# Patient Record
Sex: FEMALE | Race: WHITE | NOT HISPANIC OR LATINO | URBAN - METROPOLITAN AREA
[De-identification: names, ages, dates, MRNs, and addresses within clinical notes are randomized per-mention and may not be internally consistent; named-entity substitution may affect disease eponyms.]

---

## 2017-06-26 ENCOUNTER — EMERGENCY (EMERGENCY)
Facility: HOSPITAL | Age: 72
LOS: 1 days | Discharge: HOME | End: 2017-06-26
Attending: EMERGENCY MEDICINE | Admitting: EMERGENCY MEDICINE
Payer: COMMERCIAL

## 2017-06-26 VITALS
DIASTOLIC BLOOD PRESSURE: 80 MMHG | OXYGEN SATURATION: 96 % | SYSTOLIC BLOOD PRESSURE: 170 MMHG | HEART RATE: 81 BPM | HEIGHT: 59 IN | RESPIRATION RATE: 18 BRPM | TEMPERATURE: 98 F | WEIGHT: 134.92 LBS

## 2017-06-26 LAB
ALBUMIN SERPL ELPH-MCNC: 4.5 G/DL — SIGNIFICANT CHANGE UP (ref 3.3–5.2)
ALP SERPL-CCNC: 45 U/L — SIGNIFICANT CHANGE UP (ref 40–120)
ALT FLD-CCNC: 19 U/L — SIGNIFICANT CHANGE UP
ANION GAP SERPL CALC-SCNC: 15 MMOL/L — SIGNIFICANT CHANGE UP (ref 5–17)
AST SERPL-CCNC: 26 U/L — SIGNIFICANT CHANGE UP
BASOPHILS # BLD AUTO: 0 K/UL — SIGNIFICANT CHANGE UP (ref 0–0.2)
BASOPHILS NFR BLD AUTO: 0.3 % — SIGNIFICANT CHANGE UP (ref 0–2)
BILIRUB SERPL-MCNC: 0.3 MG/DL — LOW (ref 0.4–2)
BUN SERPL-MCNC: 28 MG/DL — HIGH (ref 8–20)
CALCIUM SERPL-MCNC: 10 MG/DL — SIGNIFICANT CHANGE UP (ref 8.6–10.2)
CHLORIDE SERPL-SCNC: 101 MMOL/L — SIGNIFICANT CHANGE UP (ref 98–107)
CK SERPL-CCNC: 115 U/L — SIGNIFICANT CHANGE UP (ref 25–170)
CO2 SERPL-SCNC: 24 MMOL/L — SIGNIFICANT CHANGE UP (ref 22–29)
CREAT SERPL-MCNC: 0.75 MG/DL — SIGNIFICANT CHANGE UP (ref 0.5–1.3)
EOSINOPHIL # BLD AUTO: 0.1 K/UL — SIGNIFICANT CHANGE UP (ref 0–0.5)
EOSINOPHIL NFR BLD AUTO: 1.3 % — SIGNIFICANT CHANGE UP (ref 0–6)
GLUCOSE SERPL-MCNC: 96 MG/DL — SIGNIFICANT CHANGE UP (ref 70–115)
HCT VFR BLD CALC: 38.3 % — SIGNIFICANT CHANGE UP (ref 37–47)
HGB BLD-MCNC: 12.6 G/DL — SIGNIFICANT CHANGE UP (ref 12–16)
LYMPHOCYTES # BLD AUTO: 1.9 K/UL — SIGNIFICANT CHANGE UP (ref 1–4.8)
LYMPHOCYTES # BLD AUTO: 27.4 % — SIGNIFICANT CHANGE UP (ref 20–55)
MCHC RBC-ENTMCNC: 27.9 PG — SIGNIFICANT CHANGE UP (ref 27–31)
MCHC RBC-ENTMCNC: 32.9 G/DL — SIGNIFICANT CHANGE UP (ref 32–36)
MCV RBC AUTO: 84.7 FL — SIGNIFICANT CHANGE UP (ref 81–99)
MONOCYTES # BLD AUTO: 0.6 K/UL — SIGNIFICANT CHANGE UP (ref 0–0.8)
MONOCYTES NFR BLD AUTO: 8.3 % — SIGNIFICANT CHANGE UP (ref 3–10)
NEUTROPHILS # BLD AUTO: 4.4 K/UL — SIGNIFICANT CHANGE UP (ref 1.8–8)
NEUTROPHILS NFR BLD AUTO: 62.4 % — SIGNIFICANT CHANGE UP (ref 37–73)
PLATELET # BLD AUTO: 214 K/UL — SIGNIFICANT CHANGE UP (ref 150–400)
POTASSIUM SERPL-MCNC: 4 MMOL/L — SIGNIFICANT CHANGE UP (ref 3.5–5.3)
POTASSIUM SERPL-SCNC: 4 MMOL/L — SIGNIFICANT CHANGE UP (ref 3.5–5.3)
PROT SERPL-MCNC: 7.3 G/DL — SIGNIFICANT CHANGE UP (ref 6.6–8.7)
RBC # BLD: 4.52 M/UL — SIGNIFICANT CHANGE UP (ref 4.4–5.2)
RBC # FLD: 14.9 % — SIGNIFICANT CHANGE UP (ref 11–15.6)
SODIUM SERPL-SCNC: 140 MMOL/L — SIGNIFICANT CHANGE UP (ref 135–145)
TROPONIN T SERPL-MCNC: <0.01 NG/ML — SIGNIFICANT CHANGE UP (ref 0–0.06)
WBC # BLD: 7.1 K/UL — SIGNIFICANT CHANGE UP (ref 4.8–10.8)
WBC # FLD AUTO: 7.1 K/UL — SIGNIFICANT CHANGE UP (ref 4.8–10.8)

## 2017-06-26 PROCEDURE — 99285 EMERGENCY DEPT VISIT HI MDM: CPT

## 2017-06-26 RX ORDER — MORPHINE SULFATE 50 MG/1
2 CAPSULE, EXTENDED RELEASE ORAL ONCE
Qty: 0 | Refills: 0 | Status: DISCONTINUED | OUTPATIENT
Start: 2017-06-26 | End: 2017-06-26

## 2017-06-26 RX ADMIN — MORPHINE SULFATE 2 MILLIGRAM(S): 50 CAPSULE, EXTENDED RELEASE ORAL at 23:29
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
## 2017-06-27 VITALS
SYSTOLIC BLOOD PRESSURE: 131 MMHG | OXYGEN SATURATION: 97 % | RESPIRATION RATE: 16 BRPM | TEMPERATURE: 97 F | DIASTOLIC BLOOD PRESSURE: 61 MMHG | HEART RATE: 85 BPM

## 2017-06-27 PROCEDURE — 80053 COMPREHEN METABOLIC PANEL: CPT

## 2017-06-27 PROCEDURE — 93005 ELECTROCARDIOGRAM TRACING: CPT

## 2017-06-27 PROCEDURE — 84484 ASSAY OF TROPONIN QUANT: CPT

## 2017-06-27 PROCEDURE — 36415 COLL VENOUS BLD VENIPUNCTURE: CPT

## 2017-06-27 PROCEDURE — 99284 EMERGENCY DEPT VISIT MOD MDM: CPT | Mod: 25

## 2017-06-27 PROCEDURE — 71260 CT THORAX DX C+: CPT

## 2017-06-27 PROCEDURE — 82550 ASSAY OF CK (CPK): CPT

## 2017-06-27 PROCEDURE — 85027 COMPLETE CBC AUTOMATED: CPT

## 2017-06-27 PROCEDURE — 96374 THER/PROPH/DIAG INJ IV PUSH: CPT | Mod: XU

## 2020-01-28 PROBLEM — E78.5 HYPERLIPIDEMIA, UNSPECIFIED: Chronic | Status: ACTIVE | Noted: 2017-06-27

## 2020-01-28 PROBLEM — Z95.0 PRESENCE OF CARDIAC PACEMAKER: Chronic | Status: ACTIVE | Noted: 2017-06-27

## 2020-01-28 PROBLEM — I10 ESSENTIAL (PRIMARY) HYPERTENSION: Chronic | Status: ACTIVE | Noted: 2017-06-27

## 2020-01-28 PROBLEM — A69.20 LYME DISEASE, UNSPECIFIED: Chronic | Status: ACTIVE | Noted: 2017-06-27

## 2020-01-28 PROBLEM — I45.9 CONDUCTION DISORDER, UNSPECIFIED: Chronic | Status: ACTIVE | Noted: 2017-06-27

## 2020-07-17 ENCOUNTER — APPOINTMENT (OUTPATIENT)
Dept: INTERNAL MEDICINE | Facility: CLINIC | Age: 75
End: 2020-07-17
Payer: MEDICARE

## 2020-07-17 VITALS
SYSTOLIC BLOOD PRESSURE: 112 MMHG | HEART RATE: 76 BPM | WEIGHT: 138 LBS | DIASTOLIC BLOOD PRESSURE: 72 MMHG | HEIGHT: 58.5 IN | OXYGEN SATURATION: 98 % | TEMPERATURE: 97.3 F | BODY MASS INDEX: 28.2 KG/M2 | RESPIRATION RATE: 14 BRPM

## 2020-07-17 DIAGNOSIS — Z82.49 FAMILY HISTORY OF ISCHEMIC HEART DISEASE AND OTHER DISEASES OF THE CIRCULATORY SYSTEM: ICD-10-CM

## 2020-07-17 DIAGNOSIS — Z78.9 OTHER SPECIFIED HEALTH STATUS: ICD-10-CM

## 2020-07-17 DIAGNOSIS — Z80.8 FAMILY HISTORY OF MALIGNANT NEOPLASM OF OTHER ORGANS OR SYSTEMS: ICD-10-CM

## 2020-07-17 DIAGNOSIS — J30.2 OTHER SEASONAL ALLERGIC RHINITIS: ICD-10-CM

## 2020-07-17 DIAGNOSIS — Z63.5 DISRUPTION OF FAMILY BY SEPARATION AND DIVORCE: ICD-10-CM

## 2020-07-17 PROCEDURE — G0442 ANNUAL ALCOHOL SCREEN 15 MIN: CPT | Mod: CS,59

## 2020-07-17 PROCEDURE — G0438: CPT | Mod: CS

## 2020-07-17 PROCEDURE — 36415 COLL VENOUS BLD VENIPUNCTURE: CPT | Mod: CS

## 2020-07-17 RX ORDER — MONTELUKAST 10 MG/1
10 TABLET, FILM COATED ORAL
Refills: 0 | Status: ACTIVE | COMMUNITY
Start: 2020-07-17

## 2020-07-17 RX ORDER — LEVOCETIRIZINE DIHYDROCHLORIDE 5 MG/1
5 TABLET ORAL
Refills: 0 | Status: ACTIVE | COMMUNITY
Start: 2020-07-17

## 2020-07-17 RX ORDER — ASPIRIN 325 MG/1
325 TABLET, FILM COATED ORAL
Refills: 0 | Status: ACTIVE | COMMUNITY
Start: 2020-07-17

## 2020-07-17 RX ORDER — COLD-HOT PACK
125 MCG EACH MISCELLANEOUS
Refills: 0 | Status: ACTIVE | COMMUNITY
Start: 2020-07-17

## 2020-07-17 SDOH — SOCIAL STABILITY - SOCIAL INSECURITY: DISRUPTION OF FAMILY BY SEPARATION AND DIVORCE: Z63.5

## 2020-07-17 NOTE — PHYSICAL EXAM
[No Acute Distress] : no acute distress [Well Nourished] : well nourished [Well-Appearing] : well-appearing [Well Developed] : well developed [Normal Sclera/Conjunctiva] : normal sclera/conjunctiva [EOMI] : extraocular movements intact [PERRL] : pupils equal round and reactive to light [Normal Oropharynx] : the oropharynx was normal [Normal Outer Ear/Nose] : the outer ears and nose were normal in appearance [No JVD] : no jugular venous distention [No Lymphadenopathy] : no lymphadenopathy [Supple] : supple [Thyroid Normal, No Nodules] : the thyroid was normal and there were no nodules present [No Respiratory Distress] : no respiratory distress  [No Accessory Muscle Use] : no accessory muscle use [Normal Rate] : normal rate  [Clear to Auscultation] : lungs were clear to auscultation bilaterally [Normal S1, S2] : normal S1 and S2 [Regular Rhythm] : with a regular rhythm [No Carotid Bruits] : no carotid bruits [No Abdominal Bruit] : a ~M bruit was not heard ~T in the abdomen [No Murmur] : no murmur heard [No Edema] : there was no peripheral edema [Pedal Pulses Present] : the pedal pulses are present [No Varicosities] : no varicosities [No Palpable Aorta] : no palpable aorta [No Extremity Clubbing/Cyanosis] : no extremity clubbing/cyanosis [Non Tender] : non-tender [Soft] : abdomen soft [Non-distended] : non-distended [No HSM] : no HSM [No Masses] : no abdominal mass palpated [Normal Bowel Sounds] : normal bowel sounds [Normal Posterior Cervical Nodes] : no posterior cervical lymphadenopathy [Normal Anterior Cervical Nodes] : no anterior cervical lymphadenopathy [No Spinal Tenderness] : no spinal tenderness [No CVA Tenderness] : no CVA  tenderness [No Rash] : no rash [Grossly Normal Strength/Tone] : grossly normal strength/tone [No Joint Swelling] : no joint swelling [Coordination Grossly Intact] : coordination grossly intact [No Focal Deficits] : no focal deficits [Normal Affect] : the affect was normal [Normal Gait] : normal gait [Deep Tendon Reflexes (DTR)] : deep tendon reflexes were 2+ and symmetric [Normal Insight/Judgement] : insight and judgment were intact

## 2020-07-20 LAB
25(OH)D3 SERPL-MCNC: 86.8 NG/ML
ALBUMIN SERPL ELPH-MCNC: 4.7 G/DL
ALP BLD-CCNC: 49 U/L
ALT SERPL-CCNC: 14 U/L
ANION GAP SERPL CALC-SCNC: 16 MMOL/L
AST SERPL-CCNC: 20 U/L
BASOPHILS # BLD AUTO: 0.04 K/UL
BASOPHILS NFR BLD AUTO: 0.9 %
BILIRUB SERPL-MCNC: 0.2 MG/DL
BUN SERPL-MCNC: 21 MG/DL
CALCIUM SERPL-MCNC: 9.6 MG/DL
CHLORIDE SERPL-SCNC: 106 MMOL/L
CHOLEST SERPL-MCNC: 189 MG/DL
CHOLEST/HDLC SERPL: 3.2 RATIO
CO2 SERPL-SCNC: 23 MMOL/L
CREAT SERPL-MCNC: 0.69 MG/DL
CREAT SPEC-SCNC: 151 MG/DL
EOSINOPHIL # BLD AUTO: 0.16 K/UL
EOSINOPHIL NFR BLD AUTO: 3.5 %
ESTIMATED AVERAGE GLUCOSE: 111 MG/DL
GLUCOSE SERPL-MCNC: 98 MG/DL
HBA1C MFR BLD HPLC: 5.5 %
HCT VFR BLD CALC: 40.9 %
HDLC SERPL-MCNC: 59 MG/DL
HGB BLD-MCNC: 12.7 G/DL
IMM GRANULOCYTES NFR BLD AUTO: 0.2 %
LDLC SERPL CALC-MCNC: 105 MG/DL
LYMPHOCYTES # BLD AUTO: 1.5 K/UL
LYMPHOCYTES NFR BLD AUTO: 33.3 %
MAN DIFF?: NORMAL
MCHC RBC-ENTMCNC: 28.6 PG
MCHC RBC-ENTMCNC: 31.1 GM/DL
MCV RBC AUTO: 92.1 FL
MICROALBUMIN 24H UR DL<=1MG/L-MCNC: <1.2 MG/DL
MICROALBUMIN/CREAT 24H UR-RTO: NORMAL MG/G
MONOCYTES # BLD AUTO: 0.37 K/UL
MONOCYTES NFR BLD AUTO: 8.2 %
NEUTROPHILS # BLD AUTO: 2.43 K/UL
NEUTROPHILS NFR BLD AUTO: 53.9 %
PLATELET # BLD AUTO: 197 K/UL
POTASSIUM SERPL-SCNC: 4.4 MMOL/L
PROT SERPL-MCNC: 6.9 G/DL
RBC # BLD: 4.44 M/UL
RBC # FLD: 13.4 %
SARS-COV-2 IGG SERPL IA-ACNC: <0.1 INDEX
SARS-COV-2 IGG SERPL QL IA: NEGATIVE
SODIUM SERPL-SCNC: 145 MMOL/L
TRIGL SERPL-MCNC: 121 MG/DL
TSH SERPL-ACNC: 0.3 UIU/ML
WBC # FLD AUTO: 4.51 K/UL

## 2020-07-20 NOTE — HISTORY OF PRESENT ILLNESS
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 3 children. . Non-smoker. No EtOH use. \par \par JORGE LUIS is a 74 year F whom is here today for an annual well check and to establish care with a new PMD\par Today, pt reports feeling well and is w/o complaints. \par \par Follows with the following Physicians: \par -OBGYN: Dr. Vargas ([phone])\par -Cardio: Dr. Duran ([phone])\par -Rheum: Dr. Ortega \par \par -Vaccines: Will need to request immunization records from prior PMD\par -DEXA: 2019\par -Mammogram: 2019\par -Colonoscopy: 10+ yrs ago. Declines repeat but ok w/ FIT-DNA\par -Pap Smear: s/p JILLIAN\par \par -HTN: Remains on Diltiazem, Clonidine & Doxazosin as she was unable to tolerate several other meds. Follows w/ cardio. No reported changes. 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 Came off medication 1/2020. Last DEXA About 1 yr ago. Follows/ Rheum. \par -Hypothyroidism: Remains on Synthroid 112mcg QD. No reported changes. \par -HyperTG: Remains on Fenofibrate. No reported changes.

## 2020-07-20 NOTE — ASSESSMENT
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 3 children. . Non-smoker. No EtOH use. \par \par JORGE LUIS is a 74 year F whom is here today for an annual well check and to establish care with a new PMD\par \par Follows with the following Physicians: \par -OBGYN: Dr. Vargas ([phone])\par -Cardio: Dr. Duran ([phone])\par -Rheum: Dr. Ortega \par \par -Vaccines: Will need to request immunization records from prior PMD\par -DEXA: 2019\par -Mammogram: 2019\par -Colonoscopy: 10+ yrs ago. Declines repeat but ok w/ FIT-DNA\par -Pap Smear: s/p JILLIAN\par \par -F/u Immunization records\par -F/u labs drawn in office today\par -Further recs pending lab results\par -F/u Fit-DNA \par -Continue f/u w/ Cardio (HTN, AV Block)\par -Continue f/u w/ Rheum (Osteoporosis)\par -RTO 6mo for f/u TSH

## 2020-07-20 NOTE — ADDENDUM
[FreeTextEntry1] : CBC/CMP WNL\par A1c/Lipid Panel WNL\par Urine Microalbumin WNL\par COVID AB Negative\par \par #1 Elevated Vit-D: Recommend decreasing daily intake to no more than 2,000iu/day\par #2 Hypothyroidism controlled on current med. Recommend she f/u in 3mo for repeat Vit-D & Thyroid testing,

## 2020-07-20 NOTE — HEALTH RISK ASSESSMENT
[Very Good] : ~his/her~  mood as very good [No falls in past year] : Patient reported no falls in the past year [No] : In the past 12 months have you used drugs other than those required for medical reasons? No [0] : 1) Little interest or pleasure doing things: Not at all (0) [Patient reported mammogram was normal] : Patient reported mammogram was normal [Patient reported bone density results were abnormal] : Patient reported bone density results were abnormal [HIV test declined] : HIV test declined [None] : None [Hepatitis C test declined] : Hepatitis C test declined [Employed] : employed [Alone] : lives alone [Fully functional (bathing, dressing, toileting, transferring, walking, feeding)] : Fully functional (bathing, dressing, toileting, transferring, walking, feeding) [# Of Children ___] : has [unfilled] children [] :  [Fully functional (using the telephone, shopping, preparing meals, housekeeping, doing laundry, using] : Fully functional and needs no help or supervision to perform IADLs (using the telephone, shopping, preparing meals, housekeeping, doing laundry, using transportation, managing medications and managing finances) [Reviewed no changes] : Reviewed no changes [Patient declined colonoscopy] : Patient declined colonoscopy [] : No [[id number]] : 0 [Audit-CScore] : 0 [Change in mental status noted] : No change in mental status noted [Reports changes in hearing] : Reports no changes in hearing [Reports changes in vision] : Reports no changes in vision [MammogramDate] : 01/19 [PapSmearComments] : s/p JILLIAN [BoneDensityDate] : 01/19 [ColonoscopyDate] : 07/20 [ColonoscopyComments] : Ok w/ FIT-DNA [HIVDate] : 07/20 [AdvancecareDate] : 07/20 [HepatitisCDate] : 07/20

## 2021-01-22 ENCOUNTER — APPOINTMENT (OUTPATIENT)
Dept: INTERNAL MEDICINE | Facility: CLINIC | Age: 76
End: 2021-01-22
Payer: MEDICARE

## 2021-01-22 DIAGNOSIS — Z20.822 CONTACT WITH AND (SUSPECTED) EXPOSURE TO COVID-19: ICD-10-CM

## 2021-01-29 ENCOUNTER — LABORATORY RESULT (OUTPATIENT)
Age: 76
End: 2021-01-29

## 2021-01-29 ENCOUNTER — APPOINTMENT (OUTPATIENT)
Dept: INTERNAL MEDICINE | Facility: CLINIC | Age: 76
End: 2021-01-29
Payer: MEDICARE

## 2021-01-29 VITALS
OXYGEN SATURATION: 98 % | RESPIRATION RATE: 14 BRPM | WEIGHT: 150 LBS | BODY MASS INDEX: 30.65 KG/M2 | TEMPERATURE: 97.2 F | HEART RATE: 77 BPM | SYSTOLIC BLOOD PRESSURE: 114 MMHG | DIASTOLIC BLOOD PRESSURE: 80 MMHG | HEIGHT: 58.5 IN

## 2021-01-29 PROCEDURE — 36415 COLL VENOUS BLD VENIPUNCTURE: CPT

## 2021-01-29 PROCEDURE — 99214 OFFICE O/P EST MOD 30 MIN: CPT | Mod: 25

## 2021-02-02 LAB
25(OH)D3 SERPL-MCNC: 64 NG/ML
ANION GAP SERPL CALC-SCNC: 11 MMOL/L
BUN SERPL-MCNC: 22 MG/DL
CALCIUM SERPL-MCNC: 9.8 MG/DL
CHLORIDE SERPL-SCNC: 105 MMOL/L
CHOLEST SERPL-MCNC: 184 MG/DL
CO2 SERPL-SCNC: 24 MMOL/L
CREAT SERPL-MCNC: 0.68 MG/DL
GLUCOSE SERPL-MCNC: 110 MG/DL
HDLC SERPL-MCNC: 66 MG/DL
LDLC SERPL CALC-MCNC: 92 MG/DL
NONHDLC SERPL-MCNC: 119 MG/DL
POTASSIUM SERPL-SCNC: 3.9 MMOL/L
SODIUM SERPL-SCNC: 140 MMOL/L
TRIGL SERPL-MCNC: 135 MG/DL
TSH SERPL-ACNC: 0.31 UIU/ML

## 2021-02-02 NOTE — ADDENDUM
[FreeTextEntry1] : CBC/BMP WNL\par Lipid Panel WNL\par Vit-D WNL\par \par #1 Hypothyroidism: Thyroid over controlled. As discussed in office, given these lab findings, we will decrease the dose of Levothyroxine to 100mcg QD. F/u in 8 weeks

## 2021-02-02 NOTE — HISTORY OF PRESENT ILLNESS
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 3 children. . Non-smoker. No EtOH use. \par \par JORGE LUIS is a 74 year F whom is here today for f/u HTN, Hypothyroid & HyperTG\par Today, pt reports feeling well and is w/o complaints.\par Denies any change since our last f/u visit\par She is due for second COVID vaccine this coming Tuesday\par \par -HTN: Remains on Diltiazem, Clonidine & Doxazosin as she was unable to tolerate several other meds. Follows w/ cardio. No reported changes. 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 Came off Prolia 1/2020. Last DEXA About 1 yr ago. Follows/ Rheum. Remains on Vit-D sup.\par -Hypothyroidism: Remains on Synthroid 112mcg QD. No reported changes. \par -HyperTG: Remains on Fenofibrate. No reported changes.

## 2021-02-02 NOTE — ASSESSMENT
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 3 children. . Non-smoker. No EtOH use. \par \par JORGE LUIS is a 74 year F whom is here today for f/u HTN, Hypothyroidism & HTN\par \par Specialaists Inolved. \par -OBGYN: Dr. Vargas ([phone])\par -Cardio: Dr. Duran ([phone])\par -Rheum: Dr. Ortega \par \par -F/u labs drawn in office today\par -Further recs pending lab results\par -Still needs to submit Fit-DNA\par -Continue f/u w/ Cardio (HTN, AV Block)\par -Continue f/u w/ Rheum (Osteoporosis)\par -RTO 6mo for CPE or sooner if needed

## 2021-02-04 RX ORDER — LEVOTHYROXINE SODIUM 0.11 MG/1
112 TABLET ORAL
Qty: 30 | Refills: 0 | Status: DISCONTINUED | COMMUNITY
Start: 2020-07-17 | End: 2021-02-04

## 2021-04-23 ENCOUNTER — APPOINTMENT (OUTPATIENT)
Dept: INTERNAL MEDICINE | Facility: CLINIC | Age: 76
End: 2021-04-23
Payer: MEDICARE

## 2021-04-23 VITALS
BODY MASS INDEX: 30.65 KG/M2 | HEART RATE: 75 BPM | TEMPERATURE: 97.8 F | DIASTOLIC BLOOD PRESSURE: 74 MMHG | SYSTOLIC BLOOD PRESSURE: 118 MMHG | RESPIRATION RATE: 14 BRPM | OXYGEN SATURATION: 98 % | HEIGHT: 58.5 IN | WEIGHT: 150 LBS

## 2021-04-23 DIAGNOSIS — D17.21 BENIGN LIPOMATOUS NEOPLASM OF SKIN AND SUBCUTANEOUS TISSUE OF RIGHT ARM: ICD-10-CM

## 2021-04-23 PROCEDURE — 99214 OFFICE O/P EST MOD 30 MIN: CPT | Mod: 25

## 2021-04-23 PROCEDURE — 36415 COLL VENOUS BLD VENIPUNCTURE: CPT

## 2021-04-23 NOTE — ASSESSMENT
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 3 children. . Non-smoker. No EtOH use. \par \par JORGE LUIS is a 74 year F whom is here today for f/u Hypothyroidism\par \par Specialaists Inolved. \par -OBGYN: Dr. Vargas ([phone])\par -Cardio: Dr. Duran ([phone])\par -Rheum: Dr. Ortega \par \par -F/u labs drawn in office today\par -Further recs pending lab results\par -Still needs to submit Fit-DNA\par -Continue f/u w/ Cardio (HTN, AV Block)\par -Continue f/u w/ Rheum (Osteoporosis)\par -RTO July for CPE or sooner if needed

## 2021-04-23 NOTE — HISTORY OF PRESENT ILLNESS
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 3 children. . Non-smoker. No EtOH use. \par \par JORGE LUIS is a 74 year F whom is here today for f/u Hypothyroid\par Today, pt reports feeling well and is w/o complaints.\par Denies any change since our last f/u visit\par \par -HTN: Remains on Diltiazem, Clonidine & Doxazosin as she was unable to tolerate several other meds. Follows w/ cardio. No reported changes. 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 Came off Prolia 1/2020. Last DEXA About 1 yr ago. Follows/ Rheum. Remains on Vit-D sup.\par -Hypothyroidism: Now on Synthroid 100mcg QD. No reported changes. \par -HyperTG: Remains on Fenofibrate. No reported changes.

## 2021-05-12 ENCOUNTER — NON-APPOINTMENT (OUTPATIENT)
Age: 76
End: 2021-05-12

## 2021-06-28 ENCOUNTER — RX RENEWAL (OUTPATIENT)
Age: 76
End: 2021-06-28

## 2021-08-27 ENCOUNTER — NON-APPOINTMENT (OUTPATIENT)
Age: 76
End: 2021-08-27

## 2021-08-27 ENCOUNTER — APPOINTMENT (OUTPATIENT)
Dept: INTERNAL MEDICINE | Facility: CLINIC | Age: 76
End: 2021-08-27
Payer: MEDICARE

## 2021-08-27 VITALS
TEMPERATURE: 97 F | BODY MASS INDEX: 30.44 KG/M2 | HEIGHT: 58.5 IN | WEIGHT: 149 LBS | SYSTOLIC BLOOD PRESSURE: 130 MMHG | OXYGEN SATURATION: 97 % | HEART RATE: 70 BPM | DIASTOLIC BLOOD PRESSURE: 80 MMHG

## 2021-08-27 DIAGNOSIS — K43.9 VENTRAL HERNIA W/OUT OBSTRUCTION OR GANGRENE: ICD-10-CM

## 2021-08-27 DIAGNOSIS — Z81.8 FAMILY HISTORY OF OTHER MENTAL AND BEHAVIORAL DISORDERS: ICD-10-CM

## 2021-08-27 DIAGNOSIS — Z23 ENCOUNTER FOR IMMUNIZATION: ICD-10-CM

## 2021-08-27 DIAGNOSIS — R01.1 CARDIAC MURMUR, UNSPECIFIED: ICD-10-CM

## 2021-08-27 PROCEDURE — G0439: CPT

## 2021-08-27 PROCEDURE — 93000 ELECTROCARDIOGRAM COMPLETE: CPT

## 2021-08-27 PROCEDURE — 36415 COLL VENOUS BLD VENIPUNCTURE: CPT

## 2021-08-27 RX ORDER — DILTIAZEM HYDROCHLORIDE 120 MG/1
120 TABLET, COATED ORAL
Refills: 0 | Status: ACTIVE | COMMUNITY
Start: 2020-07-17

## 2021-08-27 RX ORDER — CLONIDINE HYDROCHLORIDE 0.1 MG/1
0.1 TABLET ORAL TWICE DAILY
Qty: 180 | Refills: 1 | Status: ACTIVE | COMMUNITY
Start: 2020-07-17 | End: 1900-01-01

## 2021-08-27 NOTE — HISTORY OF PRESENT ILLNESS
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 3 children. . Non-smoker. No EtOH use. \par \par JORGE LUIS is a 74 year F whom is here today for an annual well check\par Today, pt reports feeling well and is w/o complaints. \par \par Specialists: \par -OBGYN: Dr. Vargas ([phone])\par -Cardio: Dr. Duran ([phone])\par -Rheum: Dr. Ortega \par \par -Vaccines: She mentions she had her PPSV23 about 5 yrs ago as well as her shingles vaccine\par -DEXA: 6/2021\par -Mammogram: 6/2021. Mammogram & B/ Breast US Q6mo. Previously being managed by her Gyn but now me\par -Colonoscopy: 10+ yrs ago. Declines repeat but ok w/ FIT-DNA\par -FH of Breast, Colon or Ovarian CA: None known\par \par -HTN: Remains on Diltiazem 120mg QD, Clonidine 0.1mg BID & Doxazosin 8mg BID. Follows w/ cardio. No reported changes. 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 No reported changes. \par \par -Hypothyroidism: Remains on Synthroid 100mcg QD. No reported changes.\par -Osteoporosis: Was Dx ~ 12yrs ago. Came off Prolia 1/2020. Last DEXA About 1 yr ago. Follows w/ Gyn. Plan is to restart Prolia. Remains on Vit-D sup.

## 2021-08-27 NOTE — ASSESSMENT
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 3 children. . Non-smoker. No EtOH use. \par \par JORGE LUIS is a 74 year F whom is here today for an annual well check\par \par Specialists:\par -OBGYN: Dr. Vargas ([phone])\par -Cardio: Dr. Duran ([phone])\par -Rheum: Dr. Ortega \par \par -F/u labs drawn in office today\par -Further recs pending lab results\par -Call Ins co regarding shingles vaccine\par -F/u DEXA & Mammogram report from her Gyn as she plans to come here now for her Prolia Inj\par -Continue f/u w/ Cardio (HTN, AV Block)\par -Continue f/u w/ Rheum (Osteoporosis)\par -RTO 6mo for f/u TSH & HTN or sooner if needed

## 2021-08-30 ENCOUNTER — NON-APPOINTMENT (OUTPATIENT)
Age: 76
End: 2021-08-30

## 2021-08-30 LAB
25(OH)D3 SERPL-MCNC: 78.9 NG/ML
ALBUMIN SERPL ELPH-MCNC: 4.5 G/DL
ALP BLD-CCNC: 54 U/L
ALT SERPL-CCNC: 27 U/L
ANION GAP SERPL CALC-SCNC: 11 MMOL/L
AST SERPL-CCNC: 23 U/L
BASOPHILS # BLD AUTO: 0.03 K/UL
BASOPHILS NFR BLD AUTO: 0.5 %
BILIRUB SERPL-MCNC: 0.3 MG/DL
BUN SERPL-MCNC: 28 MG/DL
CALCIUM SERPL-MCNC: 10.4 MG/DL
CHLORIDE SERPL-SCNC: 106 MMOL/L
CHOLEST SERPL-MCNC: 204 MG/DL
CO2 SERPL-SCNC: 26 MMOL/L
CREAT SERPL-MCNC: 0.89 MG/DL
CREAT SPEC-SCNC: 108 MG/DL
EOSINOPHIL # BLD AUTO: 0.11 K/UL
EOSINOPHIL NFR BLD AUTO: 1.8 %
ESTIMATED AVERAGE GLUCOSE: 114 MG/DL
GLUCOSE SERPL-MCNC: 81 MG/DL
HBA1C MFR BLD HPLC: 5.6 %
HCT VFR BLD CALC: 38.3 %
HDLC SERPL-MCNC: 68 MG/DL
HGB BLD-MCNC: 12.4 G/DL
IMM GRANULOCYTES NFR BLD AUTO: 0.2 %
LDLC SERPL CALC-MCNC: 114 MG/DL
LYMPHOCYTES # BLD AUTO: 1.58 K/UL
LYMPHOCYTES NFR BLD AUTO: 26.1 %
MAN DIFF?: NORMAL
MCHC RBC-ENTMCNC: 29.5 PG
MCHC RBC-ENTMCNC: 32.4 GM/DL
MCV RBC AUTO: 91.2 FL
MICROALBUMIN 24H UR DL<=1MG/L-MCNC: <1.2 MG/DL
MICROALBUMIN/CREAT 24H UR-RTO: NORMAL MG/G
MONOCYTES # BLD AUTO: 0.48 K/UL
MONOCYTES NFR BLD AUTO: 7.9 %
NEUTROPHILS # BLD AUTO: 3.84 K/UL
NEUTROPHILS NFR BLD AUTO: 63.5 %
NONHDLC SERPL-MCNC: 137 MG/DL
PLATELET # BLD AUTO: 227 K/UL
POTASSIUM SERPL-SCNC: 4.8 MMOL/L
PROT SERPL-MCNC: 6.8 G/DL
RBC # BLD: 4.2 M/UL
RBC # FLD: 13.6 %
SODIUM SERPL-SCNC: 143 MMOL/L
TRIGL SERPL-MCNC: 112 MG/DL
TSH SERPL-ACNC: 1.74 UIU/ML
WBC # FLD AUTO: 6.05 K/UL

## 2021-11-11 ENCOUNTER — RX RENEWAL (OUTPATIENT)
Age: 76
End: 2021-11-11

## 2022-02-16 ENCOUNTER — RX RENEWAL (OUTPATIENT)
Age: 77
End: 2022-02-16

## 2022-02-27 ENCOUNTER — RX RENEWAL (OUTPATIENT)
Age: 77
End: 2022-02-27

## 2022-02-27 RX ORDER — FENOFIBRATE 145 MG/1
145 TABLET, COATED ORAL DAILY
Qty: 90 | Refills: 3 | Status: ACTIVE | COMMUNITY
Start: 2020-07-17 | End: 1900-01-01

## 2022-02-27 RX ORDER — DOXAZOSIN 8 MG/1
8 TABLET ORAL TWICE DAILY
Qty: 180 | Refills: 3 | Status: ACTIVE | COMMUNITY
Start: 2020-07-17 | End: 1900-01-01

## 2022-03-08 ENCOUNTER — APPOINTMENT (OUTPATIENT)
Dept: INTERNAL MEDICINE | Facility: CLINIC | Age: 77
End: 2022-03-08
Payer: MEDICARE

## 2022-03-08 VITALS
SYSTOLIC BLOOD PRESSURE: 138 MMHG | HEIGHT: 58.5 IN | RESPIRATION RATE: 16 BRPM | BODY MASS INDEX: 30.24 KG/M2 | WEIGHT: 148 LBS | TEMPERATURE: 96.7 F | DIASTOLIC BLOOD PRESSURE: 72 MMHG | OXYGEN SATURATION: 96 % | HEART RATE: 63 BPM

## 2022-03-08 PROCEDURE — 99213 OFFICE O/P EST LOW 20 MIN: CPT

## 2022-03-08 NOTE — HISTORY OF PRESENT ILLNESS
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 3 children. . Non-smoker. No EtOH use. \par \par JORGE LUIS is a 76 year F whom is here today w/ c/o abdominal hernia That has been increasing in size as per patient since our last visit back in August.\par Patient currently denies any changes in her bowel habits or abdominal pain.

## 2022-03-08 NOTE — PHYSICAL EXAM
[Normal] : affect was normal and insight and judgment were intact [de-identified] : Right upper quadrant not reducible abdominal hernia. Nontender to palpation. Abdomen otherwise soft with normal bowel sounds.

## 2022-03-08 NOTE — ASSESSMENT
[FreeTextEntry1] : Recommend Gen. surgery consultation\par Discussed with patient that should any point in time she developed worsening constipation, abdominal pain patient to go to the ER immediately

## 2022-03-14 ENCOUNTER — APPOINTMENT (OUTPATIENT)
Dept: SURGERY | Facility: CLINIC | Age: 77
End: 2022-03-14
Payer: MEDICARE

## 2022-03-14 VITALS
OXYGEN SATURATION: 95 % | SYSTOLIC BLOOD PRESSURE: 121 MMHG | RESPIRATION RATE: 16 BRPM | HEART RATE: 70 BPM | TEMPERATURE: 97.3 F | DIASTOLIC BLOOD PRESSURE: 73 MMHG | WEIGHT: 148 LBS | HEIGHT: 57.5 IN | BODY MASS INDEX: 31.49 KG/M2

## 2022-03-14 DIAGNOSIS — Z83.3 FAMILY HISTORY OF DIABETES MELLITUS: ICD-10-CM

## 2022-03-14 PROCEDURE — 99203 OFFICE O/P NEW LOW 30 MIN: CPT

## 2022-03-14 NOTE — PHYSICAL EXAM
[JVD] : no jugular venous distention  [No Rash or Lesion] : No rash or lesion [Purpura] : no purpura  [Petechiae] : no petechiae [Skin Ulcer] : no ulcer [Skin Induration] : no induration [Alert] : alert [Oriented to Person] : oriented to person [Oriented to Place] : oriented to place [Oriented to Time] : oriented to time [Calm] : calm [de-identified] : non toxic, in no acute distress  [de-identified] : NC/AT PERRL EOMI no scleral icterus  [de-identified] : trachea midline, no gross mass  [de-identified] : no gross wheezing or stridor  [de-identified] : obese protuberant, no localizing tenderness, no guarding, no rebound, no masses  [de-identified] : FROM of all extremities with no gross deformity or angulation, there is a bulge in the upper midline, adjacent to the medial aspect of the prior cholecystectomy incision  [de-identified] : surgical scars consistent with prior surgical history  [de-identified] : mood is calm

## 2022-03-14 NOTE — CONSULT LETTER
[Dear  ___] : Dear  [unfilled], [Consult Letter:] : I had the pleasure of evaluating your patient, [unfilled]. [Please see my note below.] : Please see my note below. [Consult Closing:] : Thank you very much for allowing me to participate in the care of this patient.  If you have any questions, please do not hesitate to contact me. [Sincerely,] : Sincerely, [FreeTextEntry3] : Monico Villanueva MD, FACS\par  \par Department of Surgery\par [facility]\par [facility]\par

## 2022-03-14 NOTE — HISTORY OF PRESENT ILLNESS
[de-identified] : The patient comes to the office in consultation by Dr. Maritza Geiger for evaluation of a bulge in the upper abdominal area.  The patient states that the bulge started about 6 months ago.  She has no nausea, no vomit, and no changes in his bowel function. The patient states that she has noted the bulge to be getting larger.

## 2022-03-14 NOTE — ASSESSMENT
[FreeTextEntry1] : The patient is an obese 76 year old female with an incisional hernia.  The patient had a prior open cholecystectomy and is likely from the medial aspect of the prior incision.  The patient has been advised that weight loss will be an important adjunct to help potentially reduce the risks of infection, hernia recurrence, and chronic post operative pain associated with surgery by potentially reducing the tension along the abdominal wall.  The risks, benefits, and alternatives including the option of doing nothing to a robotic, possible laparoscopic, and possible open incisional hernia repair with  mesh were discussed.  The potential complications including but not limited to infection, bleeding, hernia recurrence, chronic post-operative pain, and seroma formation were discussed.  The patient was educated regarding the signs and symptoms of hernia strangulation and advised to seek immediate MD evaluation should this occur.  The patient understands and will return upon completion of a CT scan being ordered for surgical planning.  She will embark on weight loss and follow up once the CT scan is done.  A total of 40 minutes was spent coordinating the patient's care. \par

## 2022-03-25 ENCOUNTER — OUTPATIENT (OUTPATIENT)
Dept: OUTPATIENT SERVICES | Facility: HOSPITAL | Age: 77
LOS: 1 days | End: 2022-03-25
Payer: MEDICARE

## 2022-03-25 ENCOUNTER — APPOINTMENT (OUTPATIENT)
Dept: CT IMAGING | Facility: CLINIC | Age: 77
End: 2022-03-25
Payer: MEDICARE

## 2022-03-25 DIAGNOSIS — K43.2 INCISIONAL HERNIA WITHOUT OBSTRUCTION OR GANGRENE: ICD-10-CM

## 2022-03-25 PROCEDURE — 74177 CT ABD & PELVIS W/CONTRAST: CPT | Mod: 26,MH

## 2022-03-25 PROCEDURE — 74177 CT ABD & PELVIS W/CONTRAST: CPT

## 2022-03-26 ENCOUNTER — TRANSCRIPTION ENCOUNTER (OUTPATIENT)
Age: 77
End: 2022-03-26

## 2022-04-01 ENCOUNTER — APPOINTMENT (OUTPATIENT)
Dept: INTERNAL MEDICINE | Facility: CLINIC | Age: 77
End: 2022-04-01
Payer: MEDICARE

## 2022-04-01 ENCOUNTER — NON-APPOINTMENT (OUTPATIENT)
Age: 77
End: 2022-04-01

## 2022-04-01 VITALS
HEIGHT: 57.5 IN | SYSTOLIC BLOOD PRESSURE: 122 MMHG | BODY MASS INDEX: 31.28 KG/M2 | WEIGHT: 147 LBS | HEART RATE: 84 BPM | TEMPERATURE: 97 F | DIASTOLIC BLOOD PRESSURE: 70 MMHG | RESPIRATION RATE: 16 BRPM | OXYGEN SATURATION: 96 %

## 2022-04-01 DIAGNOSIS — E03.9 HYPOTHYROIDISM, UNSPECIFIED: ICD-10-CM

## 2022-04-01 DIAGNOSIS — E78.1 PURE HYPERGLYCERIDEMIA: ICD-10-CM

## 2022-04-01 DIAGNOSIS — R10.11 RIGHT UPPER QUADRANT PAIN: ICD-10-CM

## 2022-04-01 DIAGNOSIS — I44.2 ATRIOVENTRICULAR BLOCK, COMPLETE: ICD-10-CM

## 2022-04-01 DIAGNOSIS — E67.3 HYPERVITAMINOSIS D: ICD-10-CM

## 2022-04-01 PROCEDURE — 99214 OFFICE O/P EST MOD 30 MIN: CPT | Mod: 25

## 2022-04-01 PROCEDURE — 36415 COLL VENOUS BLD VENIPUNCTURE: CPT

## 2022-04-01 RX ORDER — DENOSUMAB 60 MG/ML
60 INJECTION SUBCUTANEOUS
Qty: 1 | Refills: 1 | Status: ACTIVE | COMMUNITY
Start: 2022-04-01 | End: 1900-01-01

## 2022-04-01 RX ORDER — LEVOTHYROXINE SODIUM 0.1 MG/1
100 TABLET ORAL
Qty: 90 | Refills: 1 | Status: ACTIVE | COMMUNITY
Start: 2021-02-04 | End: 1900-01-01

## 2022-04-01 NOTE — HISTORY OF PRESENT ILLNESS
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 3 children. . Non-smoker. No EtOH use. \par \par JORGE LUIS is a 76 year F whom is here today for f/u Abdominal Hernia, HTN, HLD, Hypothyroid\par \par -HTN: Remains on Diltiazem 120mg QD, Clonidine 0.1mg BID & Doxazosin 8mg BID. Follows w/ cardio. No reported changes. 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 No reported changes. \par \par -Hypothyroidism: Remains on Synthroid 100mcg QD. No reported changes.\par -Osteoporosis: Was Dx ~ 12yrs ago. Came off Prolia 1/2020. Last DEXA May 2021 Osteoporosis w/ lowest Tscore -3.2. Restarted on Prolia June 2021. Follows w/ Gyn. Remains on Vit-D sup. \par -Incisional Wall Abd Hernia: Following w/ [facility]. Plan is monitoring and weight loss. 3/2022 had CT Abd

## 2022-04-01 NOTE — ASSESSMENT
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 3 children. . Non-smoker. No EtOH use. \par \par JORGE LUIS is a 76 year F whom is here today for f/u Abdominal Hernia, HTN, HLD, Hypothyroid\par \par Specialists:\par -OBGYN: Dr. Vargas ([phone])\par -Cardio: Dr. Duran ([phone])\par -Rheum: Dr. Ortega \par \par -F/u labs drawn in office today\par -Further recs pending lab results\par -F/u Mammogram \par -Continue f/u w/ Cardio (HTN, AV Block)\par -Continue f/u w/ Rheum (Osteoporosis)\par -RTO for Prolia \par -RTO 6mo for CPE or sooner if needed. \par

## 2022-04-04 LAB
25(OH)D3 SERPL-MCNC: 80 NG/ML
ANION GAP SERPL CALC-SCNC: 11 MMOL/L
BUN SERPL-MCNC: 28 MG/DL
CALCIUM SERPL-MCNC: 9.7 MG/DL
CHLORIDE SERPL-SCNC: 106 MMOL/L
CHOLEST SERPL-MCNC: 167 MG/DL
CO2 SERPL-SCNC: 26 MMOL/L
CREAT SERPL-MCNC: 0.65 MG/DL
EGFR: 91 ML/MIN/1.73M2
GLUCOSE SERPL-MCNC: 109 MG/DL
HDLC SERPL-MCNC: 71 MG/DL
LDLC SERPL CALC-MCNC: 82 MG/DL
NONHDLC SERPL-MCNC: 97 MG/DL
POTASSIUM SERPL-SCNC: 4.3 MMOL/L
SODIUM SERPL-SCNC: 142 MMOL/L
TRIGL SERPL-MCNC: 75 MG/DL
TSH SERPL-ACNC: 2.22 UIU/ML

## 2022-04-05 ENCOUNTER — NON-APPOINTMENT (OUTPATIENT)
Age: 77
End: 2022-04-05

## 2022-04-06 ENCOUNTER — NON-APPOINTMENT (OUTPATIENT)
Age: 77
End: 2022-04-06

## 2022-04-07 ENCOUNTER — NON-APPOINTMENT (OUTPATIENT)
Age: 77
End: 2022-04-07

## 2022-04-19 ENCOUNTER — NON-APPOINTMENT (OUTPATIENT)
Age: 77
End: 2022-04-19

## 2022-04-25 ENCOUNTER — APPOINTMENT (OUTPATIENT)
Dept: INTERNAL MEDICINE | Facility: CLINIC | Age: 77
End: 2022-04-25
Payer: MEDICARE

## 2022-04-25 VITALS
DIASTOLIC BLOOD PRESSURE: 60 MMHG | OXYGEN SATURATION: 98 % | WEIGHT: 145 LBS | HEIGHT: 57.5 IN | RESPIRATION RATE: 16 BRPM | BODY MASS INDEX: 30.85 KG/M2 | SYSTOLIC BLOOD PRESSURE: 122 MMHG | TEMPERATURE: 97.1 F | HEART RATE: 70 BPM

## 2022-04-25 DIAGNOSIS — Z87.11 PERSONAL HISTORY OF PEPTIC ULCER DISEASE: ICD-10-CM

## 2022-04-25 DIAGNOSIS — D50.0 IRON DEFICIENCY ANEMIA SECONDARY TO BLOOD LOSS (CHRONIC): ICD-10-CM

## 2022-04-25 DIAGNOSIS — Z09 ENCOUNTER FOR FOLLOW-UP EXAMINATION AFTER COMPLETED TREATMENT FOR CONDITIONS OTHER THAN MALIGNANT NEOPLASM: ICD-10-CM

## 2022-04-25 DIAGNOSIS — I10 ESSENTIAL (PRIMARY) HYPERTENSION: ICD-10-CM

## 2022-04-25 PROCEDURE — 99496 TRANSJ CARE MGMT HIGH F2F 7D: CPT | Mod: 25

## 2022-04-25 PROCEDURE — 36415 COLL VENOUS BLD VENIPUNCTURE: CPT

## 2022-04-25 RX ORDER — PANTOPRAZOLE SODIUM 40 MG/1
40 TABLET, DELAYED RELEASE ORAL
Refills: 0 | Status: ACTIVE | COMMUNITY
Start: 2022-04-25

## 2022-04-25 NOTE — ASSESSMENT
[FreeTextEntry1] : Overall doing well following recent hospitalization from GI bleed secondary to gastric ulcer\par Continue with Protonix\par Followup with GI in June for repeat EGD\par Followup CBC and iron studies today to ensure H&H stability\par Further recommendations on the need for initiation of iron supplementation pending lab results\par

## 2022-04-25 NOTE — HISTORY OF PRESENT ILLNESS
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 3 children. . Non-smoker. No EtOH use. \par \par JORGE LUIS is a 76 year F whom is here today for f/u after recent hospital admission\par \par Patient admitted to [facility] on 4/12/22 and discharged on 4/15/22\par Patient presented with complaint of coffee ground emesis\par Patient admitted for upper GI bleed\par Per documentation, pt s/p one unit of PRBC for hemoglobin 7.7.\par Hemoglobin improved and remained stable above 9.\par s/p EGD which showed gastric ulcer. Symptoms improved on PPI. Patient tolerated p.o. diet well and discharged home.\par Patient advised to followup with GI as outpatient for repeat endoscopy in 2 weeks.\par Followup with cardiology regarding continued aspirin use.\par \par Hospital discharge lab results\par H&H 9.5/30.6. \par \par Today, pt reports that she is feeling well\par She mention she saw GI this past Wednesday and labs were drawn and as per Pt Hb dropped to 8.9\par ASA remains on hold\par Remains on Protonix 40mg BID x 1mo THEN QD\par Plan is repeat EGD June 24th\par Denies any melana, N/V, Abdominal pain

## 2022-04-26 LAB
ALBUMIN SERPL ELPH-MCNC: 4.5 G/DL
ALP BLD-CCNC: 37 U/L
ALT SERPL-CCNC: 15 U/L
ANION GAP SERPL CALC-SCNC: 11 MMOL/L
AST SERPL-CCNC: 20 U/L
BASOPHILS # BLD AUTO: 0.03 K/UL
BASOPHILS NFR BLD AUTO: 0.6 %
BILIRUB SERPL-MCNC: 0.3 MG/DL
BUN SERPL-MCNC: 29 MG/DL
CALCIUM SERPL-MCNC: 9.4 MG/DL
CHLORIDE SERPL-SCNC: 104 MMOL/L
CO2 SERPL-SCNC: 22 MMOL/L
CREAT SERPL-MCNC: 0.76 MG/DL
EGFR: 81 ML/MIN/1.73M2
EOSINOPHIL # BLD AUTO: 0.09 K/UL
EOSINOPHIL NFR BLD AUTO: 1.9 %
FERRITIN SERPL-MCNC: 30 NG/ML
GLUCOSE SERPL-MCNC: 112 MG/DL
HCT VFR BLD CALC: 32.9 %
HGB BLD-MCNC: 9.9 G/DL
IMM GRANULOCYTES NFR BLD AUTO: 0.4 %
IRON SATN MFR SERPL: 8 %
IRON SERPL-MCNC: 34 UG/DL
LYMPHOCYTES # BLD AUTO: 1.44 K/UL
LYMPHOCYTES NFR BLD AUTO: 31 %
MAN DIFF?: NORMAL
MCHC RBC-ENTMCNC: 28.5 PG
MCHC RBC-ENTMCNC: 30.1 GM/DL
MCV RBC AUTO: 94.8 FL
MONOCYTES # BLD AUTO: 0.34 K/UL
MONOCYTES NFR BLD AUTO: 7.3 %
NEUTROPHILS # BLD AUTO: 2.72 K/UL
NEUTROPHILS NFR BLD AUTO: 58.8 %
PLATELET # BLD AUTO: 235 K/UL
POTASSIUM SERPL-SCNC: 4 MMOL/L
PROT SERPL-MCNC: 6.8 G/DL
RBC # BLD: 3.47 M/UL
RBC # FLD: 15.9 %
SODIUM SERPL-SCNC: 138 MMOL/L
TIBC SERPL-MCNC: 407 UG/DL
UIBC SERPL-MCNC: 373 UG/DL
WBC # FLD AUTO: 4.64 K/UL

## 2022-04-26 RX ORDER — CHLORHEXIDINE GLUCONATE 4 %
325 (65 FE) LIQUID (ML) TOPICAL DAILY
Qty: 90 | Refills: 0 | Status: ACTIVE | COMMUNITY
Start: 2022-04-26 | End: 1900-01-01

## 2022-05-20 ENCOUNTER — APPOINTMENT (OUTPATIENT)
Dept: SURGERY | Facility: CLINIC | Age: 77
End: 2022-05-20
Payer: MEDICARE

## 2022-05-20 VITALS
HEART RATE: 61 BPM | WEIGHT: 145 LBS | SYSTOLIC BLOOD PRESSURE: 135 MMHG | TEMPERATURE: 97.2 F | RESPIRATION RATE: 16 BRPM | OXYGEN SATURATION: 97 % | DIASTOLIC BLOOD PRESSURE: 67 MMHG | BODY MASS INDEX: 30.85 KG/M2 | HEIGHT: 57.5 IN

## 2022-05-20 DIAGNOSIS — E66.9 OBESITY, UNSPECIFIED: ICD-10-CM

## 2022-05-20 DIAGNOSIS — K43.2 INCISIONAL HERNIA W/OUT OBSTRUCTION OR GANGRENE: ICD-10-CM

## 2022-05-20 PROCEDURE — 99214 OFFICE O/P EST MOD 30 MIN: CPT

## 2022-05-20 NOTE — HISTORY OF PRESENT ILLNESS
[de-identified] : The patient returns to the office with no weight loss.  She has no abdominal pain, nausea, or vomit.  She wishes to have the hernia repaired in August.  She states she has been on a diet program and participates in a gym membership but has not lost any weight.

## 2022-05-20 NOTE — ASSESSMENT
[FreeTextEntry1] : The patient is an obese 76 year old female with a fat containing incisional hernia. The patient has been advised that she will benefit from weight loss prior to repair of the hernia.  The patient has been advised that weight loss will be an important adjunct to help potentially reduce the risks of infection, hernia recurrence, and chronic post operative pain associated with surgery by potentially reducing the tension along the abdominal wall.  The patient is also in need of a repeat endoscopy to follow up on a bleeding gastric ulcer that she was recently hospitalized for at [facility].  A total of 30 minutes was spent coordinating the patient's care.

## 2022-05-20 NOTE — PHYSICAL EXAM
[JVD] : no jugular venous distention  [No Rash or Lesion] : No rash or lesion [Purpura] : no purpura  [Petechiae] : no petechiae [Skin Ulcer] : no ulcer [Skin Induration] : no induration [Alert] : alert [Oriented to Person] : oriented to person [Oriented to Place] : oriented to place [Oriented to Time] : oriented to time [Calm] : calm [de-identified] : non toxic, in no acute distress  [de-identified] : NC/AT PERRL EOMI no scleral icterus  [de-identified] : trachea midline, no gross mass  [de-identified] : no gross wheezing or stridor  [de-identified] : remains obese protuberant, no localizing tenderness, no guarding, no rebound, no masses  [de-identified] : FROM of all extremities with no gross deformity or angulation, there remains a bulge in the upper midline, adjacent to the medial aspect of the prior  cholecystectomy incision consistent with the hernias seen on CT scan   [de-identified] : surgical scars consistent with prior surgical history  [de-identified] : mood is calm

## 2022-05-20 NOTE — DATA REVIEWED
[FreeTextEntry1] : Independent review of the abd/pel CT scan reveals a 1.7 by 2 cm fat containing incisional hernia, there is no bowel obstruction, no free air or free fluid

## 2022-05-30 ENCOUNTER — NON-APPOINTMENT (OUTPATIENT)
Age: 77
End: 2022-05-30

## 2022-05-31 ENCOUNTER — NON-APPOINTMENT (OUTPATIENT)
Age: 77
End: 2022-05-31

## 2022-07-11 ENCOUNTER — APPOINTMENT (OUTPATIENT)
Dept: SURGERY | Facility: CLINIC | Age: 77
End: 2022-07-11

## 2022-09-12 NOTE — HEALTH RISK ASSESSMENT
XRAY @ bedside   [Very Good] : ~his/her~  mood as very good [No] : In the past 12 months have you used drugs other than those required for medical reasons? No [No falls in past year] : Patient reported no falls in the past year [0] : 2) Feeling down, depressed, or hopeless: Not at all (0) [Patient reported mammogram was normal] : Patient reported mammogram was normal [Patient reported bone density results were abnormal] : Patient reported bone density results were abnormal [Patient declined colonoscopy] : Patient declined colonoscopy [HIV test declined] : HIV test declined [Hepatitis C test declined] : Hepatitis C test declined [None] : None [Alone] : lives alone [Employed] : employed [] :  [# Of Children ___] : has [unfilled] children [Fully functional (bathing, dressing, toileting, transferring, walking, feeding)] : Fully functional (bathing, dressing, toileting, transferring, walking, feeding) [Fully functional (using the telephone, shopping, preparing meals, housekeeping, doing laundry, using] : Fully functional and needs no help or supervision to perform IADLs (using the telephone, shopping, preparing meals, housekeeping, doing laundry, using transportation, managing medications and managing finances) [Reviewed no changes] : Reviewed, no changes [PHQ-2 Negative - No further assessment needed] : PHQ-2 Negative - No further assessment needed [] : No [Audit-CScore] : 0 [[id number]] : 0 [Change in mental status noted] : No change in mental status noted [Reports changes in hearing] : Reports no changes in hearing [Reports changes in vision] : Reports no changes in vision [MammogramDate] : 06/21 [PapSmearComments] : s/p JILLIAN [BoneDensityDate] : 06/21 [ColonoscopyDate] : 07/20 [ColonoscopyComments] : Ok w/ FIT-DNA [HIVDate] : 07/20 [HepatitisCDate] : 07/20 [AdvancecareDate] : 08/21

## 2022-09-16 ENCOUNTER — APPOINTMENT (OUTPATIENT)
Dept: INTERNAL MEDICINE | Facility: CLINIC | Age: 77
End: 2022-09-16

## 2022-09-16 ENCOUNTER — NON-APPOINTMENT (OUTPATIENT)
Age: 77
End: 2022-09-16

## 2022-09-16 NOTE — ASSESSMENT
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 3 children. . Non-smoker. No EtOH use. \par \par JORGE LUIS is a 77 year F whom is here today for an annual well check\par \par Specialists:\par -OBGYN: Dr. Vargas ([phone])\par -Cardio: Dr. Duran ([phone])\par -Rheum: Dr. Ortega\par -GI: Dr. Robin Scott ([phone])\par \par -F/u labs drawn in office today\par -Further recs pending lab results\par -check [place] vaccine registry\par -Call Ins co regarding shingles vaccine\par -Continue f/u w/ GI (h/o Gastric Ulcer/GIB/Iron Def Anemia)\par -Continue f/u w/ Cardio (HTN, AV Block)\par -Continue f/u w/ Rheum (Osteoporosis)\par -RTO 6mo for f/u TSH & HTN or sooner if needed

## 2022-09-16 NOTE — HISTORY OF PRESENT ILLNESS
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 3 children. . Non-smoker. No EtOH use. \par \par JORGE LUIS is a 77 year F whom is here today for an annual well check\par \par Specialists:\par -OBGYN: Dr. Vargas ([phone])\par -Cardio: Dr. Duran ([phone])\par -Rheum: Dr. Ortega\par -GI: Dr. Robin Scott ([phone])\par \par -Vaccines: She mentions she had her PPSV23 about 5 yrs ago as well as her shingles vaccine\par -DEXA: 6/2021\par -Mammogram: 6/2021. Mammogram & B/ Breast US Q6mo. Previously being managed by her Gyn but now me\par -Colonoscopy: 10+ yrs ago. Declines repeat but ok w/ FIT-DNA\par -FH of Breast, Colon or Ovarian CA: None known\par -HTN: Remains on Diltiazem 120mg QD, Clonidine 0.1mg BID & Doxazosin 8mg BID. Follows w/ cardio. No reported changes. 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 No reported changes. \par \par -Hypothyroidism: Remains on Synthroid 100mcg QD. No reported changes.\par -Osteoporosis: Was Dx ~ 12yrs ago. Came off Prolia 1/2020. Last DEXA May 2021 Osteoporosis w/ lowest Tscore -3.2. Restarted on Prolia June 2021. Follows w/ Gyn. Remains on Vit-D sup. \par -Incisional Wall Abd Hernia: Following w/ [facility]. Plan is monitoring and weight loss. 3/2022 had CT Abd

## 2022-09-16 NOTE — HEALTH RISK ASSESSMENT
[Audit-CScore] : 0 [[id number]] : 0 [Change in mental status noted] : No change in mental status noted [Reports changes in hearing] : Reports no changes in hearing [Reports changes in vision] : Reports no changes in vision [MammogramDate] : 06/21 [PapSmearComments] : s/p JILLIAN [BoneDensityDate] : 06/21 [ColonoscopyDate] : 07/20 [ColonoscopyComments] : Ok w/ FIT-DNA [HIVDate] : 07/20 [HepatitisCDate] : 07/20 [AdvancecareDate] : 08/21

## 2023-03-20 ENCOUNTER — APPOINTMENT (OUTPATIENT)
Dept: ORTHOPEDIC SURGERY | Facility: CLINIC | Age: 78
End: 2023-03-20
Payer: MEDICARE

## 2023-03-20 VITALS — BODY MASS INDEX: 31.28 KG/M2 | HEIGHT: 57.5 IN | WEIGHT: 147 LBS

## 2023-03-20 DIAGNOSIS — Z00.00 ENCOUNTER FOR GENERAL ADULT MEDICAL EXAMINATION W/OUT ABNORMAL FINDINGS: ICD-10-CM

## 2023-03-20 PROCEDURE — 99204 OFFICE O/P NEW MOD 45 MIN: CPT

## 2023-03-20 PROCEDURE — 73502 X-RAY EXAM HIP UNI 2-3 VIEWS: CPT

## 2023-03-20 PROCEDURE — 72100 X-RAY EXAM L-S SPINE 2/3 VWS: CPT

## 2023-03-28 PROBLEM — Z00.00 ENCOUNTER FOR PREVENTIVE HEALTH EXAMINATION: Status: ACTIVE | Noted: 2020-01-29

## 2023-03-28 NOTE — PHYSICAL EXAM
[Normal Coordination] : normal coordination [Normal DTR UE/LE] : normal DTR UE/LE  [Normal Sensation] : normal sensation [Normal Mood and Affect] : normal mood and affect [Orientated] : orientated [Normal Skin] : normal skin [No Rash] : no rash [No Ulcers] : no ulcers [No Lesions] : no lesions [No obvious lymphadenopathy in areas examined] : no obvious lymphadenopathy in areas examined [NL (90)] : forward flexion 90 degrees [NL (40)] : right lateral bending 40 degrees [Straightening consistent with spasm] : Straightening consistent with spasm [Facet arthropathy] : Facet arthropathy [Disc space narrowing] : Disc space narrowing [Spondylolithesis] : Spondylolithesis [NL (120)] : flexion 120 degrees [NL (30)] : extension 30 degrees [NL (35)] : adduction 35 degrees [NL (45)] : internal rotation 45 degrees [5___] : adduction 5[unfilled]/5 [2+] : posterior tibialis pulse: 2+ [] : patient ambulates without assistive device [Right] : right hip with pelvis [There are no fractures, subluxations or dislocations. No significant abnormalities are seen] : There are no fractures, subluxations or dislocations. No significant abnormalities are seen [Mild arthritis (Tonnis Grade 1)] : Mild arthritis (Tonnis Grade 1)

## 2023-03-28 NOTE — HISTORY OF PRESENT ILLNESS
[de-identified] : The patient is a 77 year old RHD F who presents today complaining of lower back and right hip pain. \par Date of Injury/Onset: 1/2023\par Pain:    At Rest: 5/10 \par With Activity:  8/10 \par Mechanism of injury: No specific cause of injury \par Quality of symptoms: Aching radiating down right leg \par Improves with: Massage/ Heat temporarily \par Worse with: Certain sitting positions/ bending\par Prior treatment: None\par Prior Imaging: None\par School/Sport/Position/Occupation:  \par Additional Information: [None]\par

## 2023-03-28 NOTE — DISCUSSION/SUMMARY
[de-identified] : Assessment: The patient is a 77 year old female with low back and right hip pain and physical exam findings consistent with spodylolisthesis at L5-S1 and right hip OA. No neuro deficits at this time, no cauda equina syndrome.\par \par Patient and I discussed their symptoms. Discussed findings of today's exam and possible causes of patient's pain. Educated patient on their most probable diagnosis. Reviewed possible courses of treatment, and we collaboratively decided best course of treatment at this time will include:\par \par 1. PT\par 2. If worsening symptoms, consider MRI\par 3. NSAIDs prn\par \par The patient's current medication management of their orthopedic diagnosis was reviewed today: \par \par (1) We discussed a comprehensive treatment plan that included possible pharmaceutical management involving the use of prescription strength medications including but not limited to options such as oral Naprosyn 500mg BID, once daily Meloxicam 15 mg, or 500-650 mg Tylenol versus over the counter oral medications and topical prescription NSAID Pennsaid vs over the counter Voltaren gel. \par \par (2) There is a moderate risk of morbidity with further treatment, especially from use of prescription strength medications and possible side effects of these medications which include upset stomach with oral medications, skin reactions to topical medications and cardiac/renal issues with long term use. \par \par (3) I recommended that the patient follow-up with their medical physician to discuss any significant specific potential issues with long term medication use such as interactions with current medications or with exacerbation of underlying medical comorbidities. \par \par (4) The benefits and risks associated with use of injectable, oral or topical, prescription and over the counter anti-inflammatory medications were discussed with the patient. The patient voiced understanding of the risks including but not limited to bleeding, stroke, kidney dysfunction, heart disease, and were referred to the black box warning label for further information.\par \par Prior to appointment and during encounter with patient extensive medical records were reviewed including but not limited to, hospital records, out patient records, imaging results, and lab data. During this appointment the patient was examined, diagnoses were discussed and explained in a face to face manner. In addition extensive time was spent reviewing aforementioned diagnostic studies. Counseling including abnormal image results, differential diagnoses, treatment options, risk and benefits, lifestyle changes, current condition, and current medications was performed. Patient's comments, questions, and concerns were address and patient verbalized understanding.\par \par Follow up in 6-8 weeks.

## 2023-05-01 ENCOUNTER — APPOINTMENT (OUTPATIENT)
Dept: ORTHOPEDIC SURGERY | Facility: CLINIC | Age: 78
End: 2023-05-01

## 2023-06-05 ENCOUNTER — APPOINTMENT (OUTPATIENT)
Dept: ORTHOPEDIC SURGERY | Facility: CLINIC | Age: 78
End: 2023-06-05
Payer: MEDICARE

## 2023-06-05 VITALS — WEIGHT: 147 LBS | BODY MASS INDEX: 31.28 KG/M2 | HEIGHT: 57.5 IN

## 2023-06-05 DIAGNOSIS — M81.0 AGE-RELATED OSTEOPOROSIS W/OUT CURRENT PATHOLOGICAL FRACTURE: ICD-10-CM

## 2023-06-05 PROCEDURE — 99214 OFFICE O/P EST MOD 30 MIN: CPT

## 2023-06-05 NOTE — DISCUSSION/SUMMARY
[de-identified] : Assessment: The patient is a 77 year old female with low back and right hip pain and physical exam findings consistent with spondylolisthesis at L5-S1 and right hip OA. No neuro deficits at this time, no cauda equina syndrome.\par \par Patient and I discussed their symptoms. Discussed findings of today's exam and possible causes of patient's pain. Educated patient on their most probable diagnosis. Reviewed possible courses of treatment, and we collaboratively decided best course of treatment at this time will include:\par \par 1. PT without relief, has aggravated pain and symptoms.\par 2. Increased functional limitations, will obtain CT Lumbar spine to evaluate hnp/stenosis/spondy, she CANNOT HAVE MRI with pacemaker\par 3. NSAIDs prn\par 4. Return for CT review with pain mgmt, possible KATIE's.\par 5. Hx osteoporosis -> Prolea injections (Dr Paulo Horta)\par 6. Discussed GI precautions, hx GI ulcer 2022.  Would hold on ibuprofen.\par \par The patient's current medication management of their orthopedic diagnosis was reviewed today: \par \par (1) Discussed changing positions frequently as reviewed\par \par (2) There is a moderate risk of morbidity with further treatment, especially from use of prescription strength medications and possible side effects of these medications which include upset stomach with oral medications, skin reactions to topical medications and cardiac/renal issues with long term use. \par \par (3) I recommended that the patient follow-up with their medical physician to discuss any significant specific potential issues with long term medication use such as interactions with current medications or with exacerbation of underlying medical comorbidities. \par \par (4) The benefits and risks associated with use of injectable, oral or topical, prescription and over the counter anti-inflammatory medications were discussed with the patient. The patient voiced understanding of the risks including but not limited to bleeding, stroke, kidney dysfunction, heart disease, and were referred to the black box warning label for further information.\par \par Prior to appointment and during encounter with patient extensive medical records were reviewed including but not limited to, hospital records, out patient records, imaging results, and lab data. During this appointment the patient was examined, diagnoses were discussed and explained in a face to face manner. In addition extensive time was spent reviewing aforementioned diagnostic studies. Counseling including abnormal image results, differential diagnoses, treatment options, risk and benefits, lifestyle changes, current condition, and current medications was performed. Patient's comments, questions, and concerns were address and patient verbalized understanding.\par \par Follow up in 6-8 weeks.

## 2023-06-05 NOTE — HISTORY OF PRESENT ILLNESS
[de-identified] : The patient is a 77 year old RHD F who presents for follow up complaining of continued right sided low back, buttock and RLE pain to her calf/ankle. No groin pain. She has taken MDP x 2, last in the beginning May.  She had full relief of symptoms while on steroids, then pain recurred. She does not have any b/b dysfunction.\par Date of Injury/Onset: 1/2023\par Pain: At Rest: 5/10 \par With Activity: 8/10 \par Mechanism of injury: No specific cause of injury \par Quality of symptoms: Aching,radiating down posterior right leg, no numbness or tingling. Positional pain wakes her qhs.  She tried pillow between her legs without relief.\par Improves with: Massage/ Heat temporarily \par Worse with: She has increased functional limitations with standing, she cannot walk a block, she cannot shop, she has difficulty standing, sitting short periods intermittently. Bending. PT 18 sessions without relief.\par School/Sport/Position/Occupation:  \par Additional Information: Pt reports PT worsened the sxs

## 2023-06-05 NOTE — PHYSICAL EXAM
[Normal Coordination] : normal coordination [Normal DTR UE/LE] : normal DTR UE/LE  [Normal Sensation] : normal sensation [Normal Mood and Affect] : normal mood and affect [Orientated] : orientated [Normal Skin] : normal skin [No Rash] : no rash [No Ulcers] : no ulcers [No Lesions] : no lesions [No obvious lymphadenopathy in areas examined] : no obvious lymphadenopathy in areas examined [NL (90)] : forward flexion 90 degrees [NL (40)] : right lateral bending 40 degrees [Straightening consistent with spasm] : Straightening consistent with spasm [Facet arthropathy] : Facet arthropathy [Disc space narrowing] : Disc space narrowing [Spondylolithesis] : Spondylolithesis [NL (120)] : flexion 120 degrees [NL (30)] : extension 30 degrees [NL (35)] : adduction 35 degrees [NL (45)] : internal rotation 45 degrees [5___] : adduction 5[unfilled]/5 [2+] : posterior tibialis pulse: 2+ [Right] : right hip with pelvis [There are no fractures, subluxations or dislocations. No significant abnormalities are seen] : There are no fractures, subluxations or dislocations. No significant abnormalities are seen [Mild arthritis (Tonnis Grade 1)] : Mild arthritis (Tonnis Grade 1) [] : non-antalgic

## 2023-06-06 ENCOUNTER — RESULT REVIEW (OUTPATIENT)
Age: 78
End: 2023-06-06

## 2023-06-13 ENCOUNTER — APPOINTMENT (OUTPATIENT)
Dept: PAIN MANAGEMENT | Facility: CLINIC | Age: 78
End: 2023-06-13
Payer: MEDICARE

## 2023-06-13 VITALS — HEIGHT: 59 IN | WEIGHT: 148 LBS | BODY MASS INDEX: 29.84 KG/M2

## 2023-06-13 PROCEDURE — 99204 OFFICE O/P NEW MOD 45 MIN: CPT

## 2023-06-13 RX ORDER — METHYLPREDNISOLONE 4 MG/1
4 TABLET ORAL
Qty: 1 | Refills: 0 | Status: DISCONTINUED | COMMUNITY
Start: 2023-03-20 | End: 2023-06-13

## 2023-06-13 RX ORDER — DICLOFENAC SODIUM 75 MG/1
75 TABLET, DELAYED RELEASE ORAL TWICE DAILY
Qty: 60 | Refills: 1 | Status: DISCONTINUED | COMMUNITY
Start: 2023-03-20 | End: 2023-06-13

## 2023-06-13 NOTE — PHYSICAL EXAM
[de-identified] : Constitutional:  \par - No acute distress  \par - Well developed; well nourished  \par \par Neurological:  \par - normal mood and affect  \par - alert and oriented x 3   \par \par Cardiovascular:  \par - grossly normal \par \par Lumbar Spine Exam: \par \par Inspection: \par erythema (-) \par ecchymosis (-) \par rashes (-) \par alignment: no scoliosis \par \par Palpation: \par Midline lumbar tenderness:            (-) \par midline thoracic tenderness:          (-) \par Lumbar paraspinal tenderness:  L (-) ; R (-) \par thoracic paraspinal tenderness: L (-) ; R (-) \par sciatic nerve tenderness :          L (-) ; R (+) \par SI joint tenderness:                     L (-) ; R (-) \par GTB tenderness:                        L (-);  R (-) \par \par ROM: WNL\par \par Strength: \par                                    Right       Left    \par Hip Flexion:                (5/5)       (5/5) \par Quadriceps:               (5/5)       (5/5) \par Hamstrings:                (5/5)       (5/5) \par Ankle Dorsiflexion:     (5/5)       (5/5) \par EHL:                           (5/5)       (5/5) \par Ankle Plantarflexion:  (5/5)       (5/5) \par \par Special Tests: \par SLR:                            R (+) ; L (-) \par Facet loading:             R (+) ; L (-) \par JACQUI test:                R (-) ; L (-) \par Hamstring tightness:   R (+);  L (+) \par \par Neurologic: \par SILT throughout right lower extremity \par SILT throughout left lower extremity \par \par Reflexes normal and symmetric bilateral lower extremities \par \par Gait: \par non- antalgic gait \par ambulates without assistive device

## 2023-06-13 NOTE — DATA REVIEWED
[Lumbar Spine] : lumbar spine [Report was reviewed and noted in the chart] : The report was reviewed and noted in the chart [I reviewed the films/CD] : I reviewed the films/CD [CT Scan] : CT scan

## 2023-06-13 NOTE — HISTORY OF PRESENT ILLNESS
[Lower back] : lower back [Right Leg] : right leg [Sudden] : sudden [10] : 10 [Dull/Aching] : dull/aching [Radiating] : radiating [Constant] : constant [Walking] : walking [Bending forward] : bending forward [FreeTextEntry1] : The patient presents for initial evaluation regarding their low back pain.   Patient was referred by Dr. Hu.  \par Patient had an inciting event about 5 months ago where she was making her bed and immediately felt pain. Her pain is in the right side of the lower back with radicular pain to the buttocks and down the right leg, stopping at the middle of the right calf; she had no back pain prior to the event. Patient has tried PT with no meaningful benefit, and she has taken oral steroids with significant pain reduction following; she is not a candidate for NSAIDs due to a history of gastric ulcers.\par \par Subjective weakness: No \par Lower extremity paresthesias: No \par Bladder/bowel dysfunction: No \par \par Injections: No \par \par Pertinent Surgical History: N/A \par \par Imaging: \par 1) CT Lumbar Spine (6/6/2023) - [facility]\par \par Fractures: No acute fractures are identified. Vertebral body height is preserved.\par At L5-S1: There is disc bulge with mild bilateral neural foraminal stenosis and mild stenosis of left subarticular recess.\par At L4-5: There is disc bulge facet arthropathy and grade 1 anterolisthesis. There is mild spinal canal and bilateral neural foraminal stenosis.\par At L3-4: There is disc bulge with mild bilateral neural foraminal stenosis.\par At L2-3: No significant spinal canal or neural foraminal stenosis.\par At L1-2: There is retrolisthesis without significant spinal canal neural foraminal stenosis.\par \par Physician Disclaimer: I have personally reviewed and confirmed all HPI data with the patient.  [] : no [FreeTextEntry7] : right leg, buttock, hips [de-identified] : ct scan at [facility]

## 2023-06-13 NOTE — ASSESSMENT
[FreeTextEntry1] : A discussion regarding available pain management treatment options occurred with the patient.  These included interventional, rehabilitative, pharmacological, and alternative modalities. We will proceed with the following:  \par \par Interventional treatment options:  \par - Proceed with right L4-L5, L5-S1 TFESI with fluoroscopic guidance\par - We will perform at [facility] secondary to PPM; cardiology optimization letter required\par - see additional instructions below  \par \par Rehabilitative options:  \par - Perceived worsening with previous PT trial \par - participation in active HEP was discussed as tolerated\par \par Medication based treatment options:  \par - Not candidate for oral NSAIDs secondary to previous gastric ulcer\par - Good relief with MDP\par - Discussed trial of gabapentin with treatment failure\par - see additional instructions below  \par \par Complementary treatment options:  \par - Weight management and lifestyle modifications discussed  \par \par Additional treatment recommendations as follows:  \par - Follow up 1-2 weeks post injection for assessment of efficacy and further treatment recommendations\par \par The risks, benefits and alternatives of the proposed procedure were explained in detail with the patient. The risks outlined include but are not limited to infection, bleeding, post- dural puncture headache, nerve injury, a temporary increase in pain, failure to resolve symptoms, allergic reaction, and possible elevation of blood sugar in diabetics if using corticosteroid.  All questions were answered to patient's apparent satisfaction and he/she verbalized an understanding.\par \par The documentation recorded by the scribe, in my presence, accurately reflects the service I personally performed and the decisions made by me with my edits as appropriate. \par \par I, Doug Hook acting as scribe, attest that this documentation has been prepared under the direction and in the presence of Provider Wilber Frazier DO.

## 2023-06-24 ENCOUNTER — RX ONLY (RX ONLY)
Age: 78
End: 2023-06-24

## 2023-07-21 ENCOUNTER — OFFICE (OUTPATIENT)
Facility: LOCATION | Age: 78
Setting detail: OPHTHALMOLOGY
End: 2023-07-21
Payer: MEDICARE

## 2023-07-21 DIAGNOSIS — H04.563: ICD-10-CM

## 2023-07-21 DIAGNOSIS — H16.223: ICD-10-CM

## 2023-07-21 DIAGNOSIS — H01.001: ICD-10-CM

## 2023-07-21 DIAGNOSIS — H01.002: ICD-10-CM

## 2023-07-21 DIAGNOSIS — H01.004: ICD-10-CM

## 2023-07-21 DIAGNOSIS — H01.005: ICD-10-CM

## 2023-07-21 DIAGNOSIS — H16.143: ICD-10-CM

## 2023-07-21 DIAGNOSIS — H11.153: ICD-10-CM

## 2023-07-21 PROCEDURE — 99213 OFFICE O/P EST LOW 20 MIN: CPT | Performed by: OPHTHALMOLOGY

## 2023-07-21 ASSESSMENT — LID EXAM ASSESSMENTS
OS_PUNCTAL_STENOSIS: 1+
OS_BLEPHARITIS: 1+
OD_BLEPHARITIS: 1+
OD_PUNCTAL_STENOSIS: 1+

## 2023-07-21 ASSESSMENT — SUPERFICIAL PUNCTATE KERATITIS (SPK)
OD_SPK: 1+
OS_SPK: 1+

## 2023-07-21 ASSESSMENT — CONFRONTATIONAL VISUAL FIELD TEST (CVF)
OD_FINDINGS: FULL
OS_FINDINGS: FULL

## 2023-08-01 PROBLEM — H11.153 PINGUECULA; BOTH EYES: Status: ACTIVE | Noted: 2023-07-21

## 2023-08-01 ASSESSMENT — VISUAL ACUITY
OD_BCVA: 20/25
OS_BCVA: 20/25-2

## 2023-08-10 ENCOUNTER — OUTPATIENT (OUTPATIENT)
Dept: OUTPATIENT SERVICES | Facility: HOSPITAL | Age: 78
LOS: 1 days | End: 2023-08-10
Payer: MEDICARE

## 2023-08-10 VITALS
DIASTOLIC BLOOD PRESSURE: 77 MMHG | OXYGEN SATURATION: 100 % | WEIGHT: 151.9 LBS | TEMPERATURE: 99 F | SYSTOLIC BLOOD PRESSURE: 147 MMHG | HEIGHT: 59 IN | HEART RATE: 66 BPM | RESPIRATION RATE: 15 BRPM

## 2023-08-10 DIAGNOSIS — Z98.890 OTHER SPECIFIED POSTPROCEDURAL STATES: Chronic | ICD-10-CM

## 2023-08-10 DIAGNOSIS — Z90.710 ACQUIRED ABSENCE OF BOTH CERVIX AND UTERUS: Chronic | ICD-10-CM

## 2023-08-10 DIAGNOSIS — Z95.0 PRESENCE OF CARDIAC PACEMAKER: Chronic | ICD-10-CM

## 2023-08-10 DIAGNOSIS — Z01.818 ENCOUNTER FOR OTHER PREPROCEDURAL EXAMINATION: ICD-10-CM

## 2023-08-10 DIAGNOSIS — Z90.49 ACQUIRED ABSENCE OF OTHER SPECIFIED PARTS OF DIGESTIVE TRACT: Chronic | ICD-10-CM

## 2023-08-10 DIAGNOSIS — M54.16 RADICULOPATHY, LUMBAR REGION: ICD-10-CM

## 2023-08-10 LAB
ANION GAP SERPL CALC-SCNC: 3 MMOL/L — LOW (ref 5–17)
APTT BLD: 33 SEC — SIGNIFICANT CHANGE UP (ref 24.5–35.6)
BASOPHILS # BLD AUTO: 0.05 K/UL — SIGNIFICANT CHANGE UP (ref 0–0.2)
BASOPHILS NFR BLD AUTO: 0.9 % — SIGNIFICANT CHANGE UP (ref 0–2)
BUN SERPL-MCNC: 29 MG/DL — HIGH (ref 7–23)
CALCIUM SERPL-MCNC: 9.3 MG/DL — SIGNIFICANT CHANGE UP (ref 8.5–10.1)
CHLORIDE SERPL-SCNC: 109 MMOL/L — HIGH (ref 96–108)
CO2 SERPL-SCNC: 28 MMOL/L — SIGNIFICANT CHANGE UP (ref 22–31)
CREAT SERPL-MCNC: 0.68 MG/DL — SIGNIFICANT CHANGE UP (ref 0.5–1.3)
EGFR: 89 ML/MIN/1.73M2 — SIGNIFICANT CHANGE UP
EOSINOPHIL # BLD AUTO: 0.11 K/UL — SIGNIFICANT CHANGE UP (ref 0–0.5)
EOSINOPHIL NFR BLD AUTO: 2 % — SIGNIFICANT CHANGE UP (ref 0–6)
GLUCOSE SERPL-MCNC: 102 MG/DL — HIGH (ref 70–99)
HCT VFR BLD CALC: 39.9 % — SIGNIFICANT CHANGE UP (ref 34.5–45)
HGB BLD-MCNC: 12.8 G/DL — SIGNIFICANT CHANGE UP (ref 11.5–15.5)
IMM GRANULOCYTES NFR BLD AUTO: 0.4 % — SIGNIFICANT CHANGE UP (ref 0–0.9)
INR BLD: 1.03 RATIO — SIGNIFICANT CHANGE UP (ref 0.85–1.18)
LYMPHOCYTES # BLD AUTO: 1.42 K/UL — SIGNIFICANT CHANGE UP (ref 1–3.3)
LYMPHOCYTES # BLD AUTO: 26.3 % — SIGNIFICANT CHANGE UP (ref 13–44)
MCHC RBC-ENTMCNC: 28.5 PG — SIGNIFICANT CHANGE UP (ref 27–34)
MCHC RBC-ENTMCNC: 32.1 GM/DL — SIGNIFICANT CHANGE UP (ref 32–36)
MCV RBC AUTO: 88.9 FL — SIGNIFICANT CHANGE UP (ref 80–100)
MONOCYTES # BLD AUTO: 0.43 K/UL — SIGNIFICANT CHANGE UP (ref 0–0.9)
MONOCYTES NFR BLD AUTO: 8 % — SIGNIFICANT CHANGE UP (ref 2–14)
MRSA PCR RESULT.: SIGNIFICANT CHANGE UP
NEUTROPHILS # BLD AUTO: 3.36 K/UL — SIGNIFICANT CHANGE UP (ref 1.8–7.4)
NEUTROPHILS NFR BLD AUTO: 62.4 % — SIGNIFICANT CHANGE UP (ref 43–77)
PLATELET # BLD AUTO: 189 K/UL — SIGNIFICANT CHANGE UP (ref 150–400)
POTASSIUM SERPL-MCNC: 4 MMOL/L — SIGNIFICANT CHANGE UP (ref 3.5–5.3)
POTASSIUM SERPL-SCNC: 4 MMOL/L — SIGNIFICANT CHANGE UP (ref 3.5–5.3)
PROTHROM AB SERPL-ACNC: 11.6 SEC — SIGNIFICANT CHANGE UP (ref 9.5–13)
RBC # BLD: 4.49 M/UL — SIGNIFICANT CHANGE UP (ref 3.8–5.2)
RBC # FLD: 13.1 % — SIGNIFICANT CHANGE UP (ref 10.3–14.5)
S AUREUS DNA NOSE QL NAA+PROBE: SIGNIFICANT CHANGE UP
SODIUM SERPL-SCNC: 140 MMOL/L — SIGNIFICANT CHANGE UP (ref 135–145)
WBC # BLD: 5.39 K/UL — SIGNIFICANT CHANGE UP (ref 3.8–10.5)
WBC # FLD AUTO: 5.39 K/UL — SIGNIFICANT CHANGE UP (ref 3.8–10.5)

## 2023-08-10 PROCEDURE — 87640 STAPH A DNA AMP PROBE: CPT

## 2023-08-10 PROCEDURE — 93010 ELECTROCARDIOGRAM REPORT: CPT

## 2023-08-10 PROCEDURE — 80048 BASIC METABOLIC PNL TOTAL CA: CPT

## 2023-08-10 PROCEDURE — 85610 PROTHROMBIN TIME: CPT

## 2023-08-10 PROCEDURE — 85730 THROMBOPLASTIN TIME PARTIAL: CPT

## 2023-08-10 PROCEDURE — 36415 COLL VENOUS BLD VENIPUNCTURE: CPT

## 2023-08-10 PROCEDURE — 85025 COMPLETE CBC W/AUTO DIFF WBC: CPT

## 2023-08-10 PROCEDURE — 87641 MR-STAPH DNA AMP PROBE: CPT

## 2023-08-10 PROCEDURE — 93005 ELECTROCARDIOGRAM TRACING: CPT

## 2023-08-10 NOTE — H&P PST ADULT - ASSESSMENT
77 year old female with PMH of Lymes Disease, 2nd degree heart block s/p PPM placement about 20 years ago ([facility]), HTN, HLD, hypothyroid, dry eyes, with 4 month history of lumbar radiculopathy; pain not improving despite conservative management including physical therapy, acupuncture, medication, rest, ice, heat therapy.  Pain began in March/April 2023 when she was changing bed sheets and lifted the mattress, she developed pain across the lower back and radiating down the right lower extremity to the ankle.  Pain is described as sharp, aching, constant, currently 3/10 on pain scale today; worsens with standing or walking too long.  She presents to PST today for planned Right L4-5, L5-S1 transforaminal epidural steroid injection for treatment of lumbar radiculopathy, with Dr. Frazier on August 17, 2023      Plan:  1. PST instructions given ; NPO status/  instructions to be given by ASU   2. Pt instructed to take following meds on day of surgery: doxazosin, levothyroxine, clonidine, diltiazem  3. Pt instructed to take routine evening medications unless indicated   4. Stop NSAIDS ( Aspirin Aleve Motrin Mobic Diclofenac), herbal supplements , MVI , VitaminE,  fish oil 7 days prior to surgery  unless   directed by surgeon or cardiologist; patient will discuss holding aspirin with Dr. Duran and Dr. Frazier today  5. Medical Optimization  with Dr. Duran (cardiology)  PPM card photocopied and on file   6. EZ wash instructions given   7. Labs EKG as per surgeon request   8. Pt denies covid symptoms shortness of breath fever cough

## 2023-08-10 NOTE — H&P PST ADULT - HISTORY OF PRESENT ILLNESS
77 year old female with PMH of Lymes Disease, 2nd degree heart block s/p PPM placement about 20 years ago ([facility]), HTN, HLD, hypothyroid, dry eyes, with 4 month history of lumbar radiculopathy; pain not improving despite conservative management including physical therapy, acupuncture, medication, rest, ice, heat therapy.  Pain began in March/April 2023 when she was changing bed sheets and lifted the mattress, she developed pain across the lower back and radiating down the right lower extremity to the ankle.  Pain is described as sharp, aching, constant, currently 3/10 on pain scale today; worsens with standing or walking too long.  She presents to PST today for planned Right L4-5, L5-S1 transforaminal epidural steroid injection for treatment of lumbar radiculopathy, with Dr. Frazier on August 17, 2023

## 2023-08-10 NOTE — H&P PST ADULT - NS PRO PASSIVE SMOKE EXP
-- No

## 2023-08-10 NOTE — H&P PST ADULT - NSICDXPASTMEDICALHX_GEN_ALL_CORE_FT
PAST MEDICAL HISTORY:  Heart block     HLD (hyperlipidemia)     HTN (hypertension)     Lyme disease     Pacemaker      PAST MEDICAL HISTORY:  Dry eyes     H/O low back pain     Heart block     HLD (hyperlipidemia)     HTN (hypertension)     Hypothyroid     Lumbar radiculopathy     Lyme disease     Pacemaker

## 2023-08-10 NOTE — H&P PST ADULT - NSICDXFAMILYHX_GEN_ALL_CORE_FT
FAMILY HISTORY:  Father  Still living? No  FH: type 2 diabetes, Age at diagnosis: Age Unknown    Sibling  Still living? Yes, Estimated age: 71-80  FH: thyroid cancer, Age at diagnosis: Age Unknown    Child  Still living? Yes, Estimated age: 51-60  FH: type 2 diabetes, Age at diagnosis: Age Unknown

## 2023-08-10 NOTE — H&P PST ADULT - NS MD HP INPLANTS MED DEV
PPM interrogated w/in last 3 months/Pacemaker PPM interrogated w/in last 3 months, abdominal mesh/Pacemaker

## 2023-08-10 NOTE — H&P PST ADULT - EKG AND INTERPRETATION
EKG done today, results forwarded to Cardiology for final interpretation EKG done today, results forwarded to Cardiology for final interpretation; NSR

## 2023-08-10 NOTE — H&P PST ADULT - NSICDXPASTSURGICALHX_GEN_ALL_CORE_FT
PAST SURGICAL HISTORY:  H/O breast biopsy     H/O rotator cuff surgery     H/O: hysterectomy     History of cholecystectomy     History of hernia repair     S/P placement of cardiac pacemaker

## 2023-08-11 DIAGNOSIS — M54.16 RADICULOPATHY, LUMBAR REGION: ICD-10-CM

## 2023-08-11 DIAGNOSIS — Z01.818 ENCOUNTER FOR OTHER PREPROCEDURAL EXAMINATION: ICD-10-CM

## 2023-08-16 RX ORDER — FENTANYL CITRATE 50 UG/ML
50 INJECTION INTRAVENOUS
Refills: 0 | Status: DISCONTINUED | OUTPATIENT
Start: 2023-08-17 | End: 2023-08-17

## 2023-08-16 RX ORDER — SODIUM CHLORIDE 9 MG/ML
1000 INJECTION, SOLUTION INTRAVENOUS
Refills: 0 | Status: DISCONTINUED | OUTPATIENT
Start: 2023-08-17 | End: 2023-08-17

## 2023-08-16 RX ORDER — ONDANSETRON 8 MG/1
4 TABLET, FILM COATED ORAL ONCE
Refills: 0 | Status: DISCONTINUED | OUTPATIENT
Start: 2023-08-17 | End: 2023-08-17

## 2023-08-16 RX ORDER — OXYCODONE HYDROCHLORIDE 5 MG/1
5 TABLET ORAL ONCE
Refills: 0 | Status: DISCONTINUED | OUTPATIENT
Start: 2023-08-17 | End: 2023-08-17

## 2023-08-17 ENCOUNTER — OUTPATIENT (OUTPATIENT)
Dept: INPATIENT UNIT | Facility: HOSPITAL | Age: 78
LOS: 1 days | Discharge: HOME | End: 2023-08-17
Payer: MEDICARE

## 2023-08-17 ENCOUNTER — APPOINTMENT (OUTPATIENT)
Dept: ORTHOPEDIC SURGERY | Facility: HOSPITAL | Age: 78
End: 2023-08-17
Payer: MEDICARE

## 2023-08-17 ENCOUNTER — TRANSCRIPTION ENCOUNTER (OUTPATIENT)
Age: 78
End: 2023-08-17

## 2023-08-17 VITALS
TEMPERATURE: 98 F | RESPIRATION RATE: 14 BRPM | OXYGEN SATURATION: 96 % | HEART RATE: 74 BPM | DIASTOLIC BLOOD PRESSURE: 44 MMHG | SYSTOLIC BLOOD PRESSURE: 113 MMHG | HEIGHT: 59 IN | WEIGHT: 151.9 LBS

## 2023-08-17 VITALS
RESPIRATION RATE: 14 BRPM | OXYGEN SATURATION: 98 % | SYSTOLIC BLOOD PRESSURE: 138 MMHG | DIASTOLIC BLOOD PRESSURE: 68 MMHG | HEART RATE: 70 BPM | TEMPERATURE: 98 F

## 2023-08-17 DIAGNOSIS — Z98.890 OTHER SPECIFIED POSTPROCEDURAL STATES: Chronic | ICD-10-CM

## 2023-08-17 DIAGNOSIS — Z95.0 PRESENCE OF CARDIAC PACEMAKER: Chronic | ICD-10-CM

## 2023-08-17 DIAGNOSIS — Z90.49 ACQUIRED ABSENCE OF OTHER SPECIFIED PARTS OF DIGESTIVE TRACT: Chronic | ICD-10-CM

## 2023-08-17 DIAGNOSIS — M54.16 RADICULOPATHY, LUMBAR REGION: ICD-10-CM

## 2023-08-17 DIAGNOSIS — Z90.710 ACQUIRED ABSENCE OF BOTH CERVIX AND UTERUS: Chronic | ICD-10-CM

## 2023-08-17 PROCEDURE — 64484 NJX AA&/STRD TFRM EPI L/S EA: CPT | Mod: RT

## 2023-08-17 PROCEDURE — 76000 FLUOROSCOPY <1 HR PHYS/QHP: CPT

## 2023-08-17 PROCEDURE — 64483 NJX AA&/STRD TFRM EPI L/S 1: CPT | Mod: RT

## 2023-08-17 RX ORDER — DILTIAZEM HCL 120 MG
1 CAPSULE, EXT RELEASE 24 HR ORAL
Refills: 0 | DISCHARGE

## 2023-08-17 RX ORDER — CHOLECALCIFEROL (VITAMIN D3) 125 MCG
1 CAPSULE ORAL
Refills: 0 | DISCHARGE

## 2023-08-17 RX ORDER — ASPIRIN/CALCIUM CARB/MAGNESIUM 324 MG
1 TABLET ORAL
Refills: 0 | DISCHARGE

## 2023-08-17 RX ORDER — DOXAZOSIN MESYLATE 4 MG
1 TABLET ORAL
Refills: 0 | DISCHARGE

## 2023-08-17 RX ORDER — ZINC ACETATE DIHYDRATE 100 %
1 CRYSTALS MISCELLANEOUS
Refills: 0 | DISCHARGE

## 2023-08-17 RX ORDER — LEVOTHYROXINE SODIUM 125 MCG
1 TABLET ORAL
Refills: 0 | DISCHARGE

## 2023-08-17 RX ORDER — PREGABALIN 225 MG/1
1 CAPSULE ORAL
Refills: 0 | DISCHARGE

## 2023-08-17 RX ORDER — FENOFIBRIC ACID 105 MG/1
1 TABLET ORAL
Refills: 0 | DISCHARGE

## 2023-08-17 RX ORDER — VITAMIN E 100 UNIT
1 CAPSULE ORAL
Refills: 0 | DISCHARGE

## 2023-08-17 RX ADMIN — SODIUM CHLORIDE 100 MILLILITER(S): 9 INJECTION, SOLUTION INTRAVENOUS at 08:27

## 2023-08-17 NOTE — ASU DISCHARGE PLAN (ADULT/PEDIATRIC) - CONDITION AT DISCHARGE
[facility]- Pediatric Dermatology  Dr. Michelle Sullivan, Dr. Dena Bishop, Dr. Remigio Phelps, Dr. Filomena Gardner, Dr. Derick Wright       Pediatric Appointment Scheduling and Call Center [phone]     Non Urgent -Triage Voicemail Line; [phone]- Regina and Nora RN's. Messages are checked periodically throughout the day and are returned as soon as possible.      Clinic Fax number: [phone]    If you need a prescription refill, please contact your pharmacy. They will send us an electronic request. Refills are approved or denied by our Physicians during normal business hours, Monday through Fridays    Per office policy, refills will not be granted if you have not been seen within the past year (or sooner depending on your child's condition)    *Radiology Scheduling- [phone]  *Sedation Unit Scheduling- [phone]  *[place] Scheduling- General [phone]; Pediatric Dermatology [phone]  *Main  Services: [phone]   [place]: [phone]   Stateless: [phone]   Hmong/Mongolian/Luther: [phone]    For urgent matters that cannot wait until the next business day, is over a holiday and/or a weekend please call [phone] and ask for the Dermatology Resident On-Call to be paged.               Today:   - We recommend you follow-up with your orthodontist to obtain another x-ray  - Continue your current medications - MMF 1000mg twice daily and Methotrexate 25mg daily  - Follow up in derm clinic in 6 months at which time we will discuss coming off of a medication     
Stable

## 2023-08-17 NOTE — ASU DISCHARGE PLAN (ADULT/PEDIATRIC) - NS MD DC FALL RISK RISK
For information on Fall & Injury Prevention, visit: [email or web address] OR  [email or web address] OR  https://www.cdc.gov/steadi/patient.html

## 2023-08-17 NOTE — BRIEF OPERATIVE NOTE - NSICDXBRIEFPROCEDURE_GEN_ALL_CORE_FT
PROCEDURES:  Transforaminal epidural steroid injection into lumbar spine 17-Aug-2023 07:51:52 L4-L5, L5-S1 transforaminal epidural steroid injection Wilber Frazier

## 2023-08-17 NOTE — ASU PATIENT PROFILE, ADULT - NSICDXPASTMEDICALHX_GEN_ALL_CORE_FT
PAST MEDICAL HISTORY:  Dry eyes     H/O low back pain     Heart block     HLD (hyperlipidemia)     HTN (hypertension)     Hypothyroid     Lumbar radiculopathy     Lyme disease     Pacemaker

## 2023-08-22 ENCOUNTER — APPOINTMENT (OUTPATIENT)
Dept: ORTHOPEDIC SURGERY | Facility: AMBULATORY SURGERY CENTER | Age: 78
End: 2023-08-22

## 2023-08-22 DIAGNOSIS — Z88.2 ALLERGY STATUS TO SULFONAMIDES: ICD-10-CM

## 2023-08-22 DIAGNOSIS — I10 ESSENTIAL (PRIMARY) HYPERTENSION: ICD-10-CM

## 2023-08-22 DIAGNOSIS — I44.1 ATRIOVENTRICULAR BLOCK, SECOND DEGREE: ICD-10-CM

## 2023-08-22 DIAGNOSIS — Z79.82 LONG TERM (CURRENT) USE OF ASPIRIN: ICD-10-CM

## 2023-08-22 DIAGNOSIS — M54.16 RADICULOPATHY, LUMBAR REGION: ICD-10-CM

## 2023-08-22 DIAGNOSIS — Z90.710 ACQUIRED ABSENCE OF BOTH CERVIX AND UTERUS: ICD-10-CM

## 2023-08-22 DIAGNOSIS — Z87.891 PERSONAL HISTORY OF NICOTINE DEPENDENCE: ICD-10-CM

## 2023-08-22 DIAGNOSIS — E03.9 HYPOTHYROIDISM, UNSPECIFIED: ICD-10-CM

## 2023-08-22 DIAGNOSIS — Z95.0 PRESENCE OF CARDIAC PACEMAKER: ICD-10-CM

## 2023-08-22 DIAGNOSIS — E78.5 HYPERLIPIDEMIA, UNSPECIFIED: ICD-10-CM

## 2023-09-01 ENCOUNTER — OFFICE (OUTPATIENT)
Facility: LOCATION | Age: 78
Setting detail: OPHTHALMOLOGY
End: 2023-09-01
Payer: MEDICARE

## 2023-09-01 ENCOUNTER — RX ONLY (RX ONLY)
Age: 78
End: 2023-09-01

## 2023-09-01 DIAGNOSIS — H16.143: ICD-10-CM

## 2023-09-01 DIAGNOSIS — H04.563: ICD-10-CM

## 2023-09-01 PROCEDURE — 99213 OFFICE O/P EST LOW 20 MIN: CPT | Performed by: OPHTHALMOLOGY

## 2023-09-01 ASSESSMENT — CONFRONTATIONAL VISUAL FIELD TEST (CVF)
OS_FINDINGS: FULL
OD_FINDINGS: FULL

## 2023-09-01 ASSESSMENT — TONOMETRY
OD_IOP_MMHG: 17
OS_IOP_MMHG: 17

## 2023-09-01 ASSESSMENT — LID EXAM ASSESSMENTS
OD_BLEPHARITIS: 1+
OD_PUNCTAL_STENOSIS: 1+
OS_PUNCTAL_STENOSIS: 1+
OS_BLEPHARITIS: 1+

## 2023-09-01 ASSESSMENT — SUPERFICIAL PUNCTATE KERATITIS (SPK)
OD_SPK: T
OS_SPK: 1+

## 2023-09-01 NOTE — DATA REVIEWED
[CT Scan] : CT scan [Lumbar Spine] : lumbar spine [Report was reviewed and noted in the chart] : The report was reviewed and noted in the chart [I reviewed the films/CD] : I reviewed the films/CD

## 2023-09-05 ENCOUNTER — APPOINTMENT (OUTPATIENT)
Dept: PAIN MANAGEMENT | Facility: CLINIC | Age: 78
End: 2023-09-05
Payer: MEDICARE

## 2023-09-05 VITALS — HEIGHT: 59 IN | BODY MASS INDEX: 30.24 KG/M2 | WEIGHT: 150 LBS

## 2023-09-05 DIAGNOSIS — M43.10 SPONDYLOLISTHESIS, SITE UNSPECIFIED: ICD-10-CM

## 2023-09-05 DIAGNOSIS — M54.16 RADICULOPATHY, LUMBAR REGION: ICD-10-CM

## 2023-09-05 PROCEDURE — 99214 OFFICE O/P EST MOD 30 MIN: CPT

## 2023-09-05 ASSESSMENT — VISUAL ACUITY
OS_BCVA: 20/25
OD_BCVA: 20/30

## 2023-09-05 NOTE — PHYSICAL EXAM
[de-identified] : Constitutional:   - No acute distress   - Well developed; well nourished    Neurological:   - normal mood and affect   - alert and oriented x 3     Cardiovascular:   - grossly normal   Lumbar Spine Exam:   Inspection:  erythema (-)  ecchymosis (-)  rashes (-)  alignment: no scoliosis   Palpation:  Midline lumbar tenderness:            (-)  midline thoracic tenderness:          (-)  Lumbar paraspinal tenderness:  L (-) ; R (-)  thoracic paraspinal tenderness: L (-) ; R (-)  sciatic nerve tenderness :          L (-) ; R (+)  SI joint tenderness:                     L (-) ; R (-)  GTB tenderness:                        L (-);  R (-)   ROM: WNL  Strength:                                     Right       Left     Hip Flexion:                (5/5)       (5/5)  Quadriceps:               (5/5)       (5/5)  Hamstrings:                (5/5)       (5/5)  Ankle Dorsiflexion:     (5/5)       (5/5)  EHL:                           (5/5)       (5/5)  Ankle Plantarflexion:  (5/5)       (5/5)   Special Tests:  SLR:                            R (eq) ; L (-)  Facet loading:             R (+) ; L (-)  JACQUI test:                R (-) ; L (-)  Hamstring tightness:   R (+);  L (+)   Neurologic:  SILT throughout right lower extremity  SILT throughout left lower extremity   Reflexes normal and symmetric bilateral lower extremities   Gait:  non- antalgic gait

## 2023-09-05 NOTE — ASSESSMENT
[FreeTextEntry1] : A discussion regarding available pain management treatment options occurred with the patient.  These included interventional, rehabilitative, pharmacological, and alternative modalities. We will proceed with the following:    Interventional treatment options:   - Would proceed with repeat right L4-L5, L5-S1 TFESI (80mg Kenalog) with return of severe radicular pain - will call - We will perform at [facility] secondary to PPM; cardiology optimization letter required\ - Patient may remain on aspirin for indicated procedure - see additional instructions below    Rehabilitative options:   - Perceived worsening with previous PT trial; she defers retrial - participation in active HEP was discussed as tolerated - home exercise sheet provided  Medication based treatment options:   - Not candidate for oral NSAIDs secondary to previous gastric ulcer - Continue Tylenol 500-1000 mg up to TID as needed - Discussed trial of gabapentin with treatment failure - see additional instructions below    Complementary treatment options:   - Weight management and lifestyle modifications discussed    Additional treatment recommendations as follows:   - Follow-up for repeat procedure or as needed basis  The risks, benefits and alternatives of the proposed procedure were explained in detail with the patient. The risks outlined include but are not limited to infection, bleeding, post- dural puncture headache, nerve injury, a temporary increase in pain, failure to resolve symptoms, allergic reaction, and possible elevation of blood sugar in diabetics if using corticosteroid.  All questions were answered to patient's apparent satisfaction and he/she verbalized an understanding.  The documentation recorded by the scribe, in my presence, accurately reflects the service I personally performed and the decisions made by me with my edits as appropriate.   I, Doug Hook acting as scribe, attest that this documentation has been prepared under the direction and in the presence of Provider Wilber Frazier DO.

## 2023-09-05 NOTE — HISTORY OF PRESENT ILLNESS
[Lower back] : lower back [Right Leg] : right leg [Sudden] : sudden [10] : 10 [Dull/Aching] : dull/aching [Radiating] : radiating [Constant] : constant [Walking] : walking [Bending forward] : bending forward [FreeTextEntry1] : 2023- Patient presents for FUV after a right L4-5, L5-S1 TFESI on 2023. Patient reports significant reduction of pain following the injection (80% reduction of pain). Her right sided radicular pain has resolved, and she is able to walk comfortably and perform ADLs with a greater degree of comfort. She is happy with the results of the procedure.  2023- The patient presents for initial evaluation regarding their low back pain.   Patient was referred by Dr. Hu. Patient had an inciting event about 5 months ago where she was making her bed and immediately felt pain. Her pain is in the right side of the lower back with radicular pain to the buttocks and down the right leg, stopping at the middle of the right calf; she had no back pain prior to the event. Patient has tried PT with no meaningful benefit, and she has taken oral steroids with significant pain reduction following; she is not a candidate for NSAIDs due to a history of gastric ulcers.  Injections: 1) Right L4-5, L5-S1 TFESI (2023)  Pertinent Surgical History: N/A   Imagin) CT Lumbar Spine (2023) - [facility] Rad Fractures: No acute fractures are identified. Vertebral body height is preserved. At L5-S1: There is disc bulge with mild bilateral neural foraminal stenosis and mild stenosis of left subarticular recess. At L4-5: There is disc bulge facet arthropathy and grade 1 anterolisthesis. There is mild spinal canal and bilateral neural foraminal stenosis. At L3-4: There is disc bulge with mild bilateral neural foraminal stenosis. At L2-3: No significant spinal canal or neural foraminal stenosis. At L1-2: There is retrolisthesis without significant spinal canal neural foraminal stenosis.  Physician Disclaimer: I have personally reviewed and confirmed all HPI data with the patient.  [] : no [de-identified] : ct scan at [facility] [FreeTextEntry7] : right leg, buttock, hips

## 2023-09-29 NOTE — ED ADULT NURSE NOTE - CAS EDN DISCHARGE ASSESSMENT
[facility] AMBULATORY ENCOUNTER  URGENT CARE OFFICE VISIT    CHIEF COMPLAINT  Chief Complaint   Patient presents with   • Sore Throat     Entered by patient       Primary Historian: Patient , Mother, for detail clarification    SUBJECTIVE  Kiah is a 14 year old female who presents today for sore throat and runny nose.    Symptoms started about 1 week ago (7 days). Had a smiliar illness 3 weeks ago which resolved and now is having a second cold. Was evaluated in Urgent Care with the prior illness on 9/4 where she was diagnosed with serous AOM and prescribed azithromycin. She resolved completely after this illness.     Currently is feeling more fatigued and is endorsing a sore throat. Left ear feels plugged. No fevers. She is congested and is coughing. Feels like she is having post-nasal drip. No facial pain or pressure. No headaches or abdominal pain. No vomiting or diarrhea. No rashes. Lots of illnesses at school, unsure of specifics. No one sick at home. Eating and drinking well despite sore throat. No history of strep throat in the past. Has history of asthma and mom is giving albuterol 2 times per day to help with breathing which she says help.       PAST MEDICAL HISTORY  Past Medical History:   Diagnosis Date   • Allergic rhinitis, cause unspecified 08/23/2011   • Asthma     Moderate persistent. Flovent 110 mcg 2 puffs BID       PAST SURGICAL HISTORY  Past Surgical History:   Procedure Laterality Date   • Past surgical history  07/20/2010    None        FAMILY HISTORY  History reviewed. No pertinent family history.    MEDICATIONS  Current Outpatient Medications   Medication Sig Dispense Refill   • albuterol 108 (90 Base) MCG/ACT inhaler Inhale 4 puffs into the lungs.     • fluticasone propionate (FLOVENT HFA) 110 MCG/ACT inhaler Inhale 2 puffs into the lungs in the morning and 2 puffs in the evening.     • Spacer/Aero-Hold Chamber Mask MISC Please dispense one medium sized aerochamber mask (patient already has  the aerochamber). 1 Device 0   • Cetirizine HCl 5 MG/5ML SYRP 1 teaspoon by mouth daily. 120 mL 11     No current facility-administered medications for this visit.        DRUG ALLERGIES  ALLERGIES:   Allergen Reactions   • Dander Other (See Comments)     +cat, dog           OBJECTIVE  Vital signs:   Visit Vitals  /74 (BP Location: RUE - Right upper extremity, Patient Position: Sitting, Cuff Size: Regular)   Pulse 84   Temp (!) 96.8 °F (36 °C) (Temporal)   Resp 18   Wt 59.9 kg (132 lb)   LMP 09/18/2023 (Approximate)   SpO2 97%      Exam:  General: Well appearing, no acute distress  HENT: Normocephalic, atraumatic, mucus membranes moist with no lesions, no palpable cervical lymphadenopathy     Right Ear: Ear canal normal. TM Pearly grey, normal landmarks, no effusion     Left Ear: Ear canal normal. Mildly erythematous but no effusion, normal landmarks      Nose: Clear rhinorrhea      Pharynx: Erythema to posterior pharynx and soft palate. No significant tonsilar hypertrophy, no tonsilar exudates, mild erythema  Eyes: Conjunctivae normal bilaterally   Cardiovascular: Regular rate and rhythm, no murmurs appreciated   Pulmonary: Clear to auscultation bilaterally with good aeration, no wheezing, rhonchi or crackles, normal respiratory effort, no respiratory distress  Abdominal: Soft, non-tender, non-distended, no guarding  Skin: No appreciable rash   Neurological: Normal mental status, alert and oriented    MSK: No obvious deformities or complaints       Results for orders placed or performed in visit on 09/29/23   Streptococcus group A PCR    Specimen: Throat; Swab   Result Value    STREPTOCOCCUS GROUP A PCR Not Detected     Comment: This result was obtained by RT-PCR amplification followed by fluorescence detection. This test has been cleared by the U.S. Food and Drug Administration for detection of Strep A.          ASSESSMENT/PLAN    Viral Pharyngitis: Current illness likely represents viral URI with  pharyngitis. No source of bacterial infection found on exam today: rapid strep negative, no acute otitis media, clinical pneumonia, or concerns for bacterial sinusitis. Clinic testing offered for COVID/flu which was declined. Does have history of seasonal allergies and as been prescribed Flonase and Zyrtec previously. Recommended to trial use of that to see if antihistamine helps with congestion and improves post-nasal drip. Patient is overall very well appearing, in no respiratory distress and clinically well hydrated. Patient instructions provided and reviewed.  - Continue rest and home supportive care with:  Tylenol/motrin PRN for discomfort   Continued good fluid hydration   - Follow up with pediatrician or return to clinic if:    Fevers develop   Not tolerating fluids or concerns for dehydration    Difficulty breathing    Any other new questions or concerns   - Medications started today: None          Orders Placed This Encounter   • Streptococcus group A PCR         Norma Casper MD  Pediatric Urgent Care      Alert and oriented to person, place and time

## 2023-12-15 ENCOUNTER — OFFICE (OUTPATIENT)
Facility: LOCATION | Age: 78
Setting detail: OPHTHALMOLOGY
End: 2023-12-15
Payer: MEDICARE

## 2023-12-15 DIAGNOSIS — H16.143: ICD-10-CM

## 2023-12-15 DIAGNOSIS — H04.563: ICD-10-CM

## 2023-12-15 PROCEDURE — 99213 OFFICE O/P EST LOW 20 MIN: CPT | Performed by: OPHTHALMOLOGY

## 2023-12-15 ASSESSMENT — CONFRONTATIONAL VISUAL FIELD TEST (CVF)
OS_FINDINGS: FULL
OD_FINDINGS: FULL

## 2023-12-15 ASSESSMENT — SUPERFICIAL PUNCTATE KERATITIS (SPK)
OD_SPK: T 1+
OS_SPK: T 1+

## 2023-12-15 ASSESSMENT — LID EXAM ASSESSMENTS
OS_PUNCTAL_STENOSIS: 1+
OD_PUNCTAL_STENOSIS: 1+
OD_BLEPHARITIS: 1+
OS_BLEPHARITIS: 1+

## 2024-03-15 ENCOUNTER — OFFICE (OUTPATIENT)
Facility: LOCATION | Age: 79
Setting detail: OPHTHALMOLOGY
End: 2024-03-15
Payer: MEDICARE

## 2024-03-15 DIAGNOSIS — H04.563: ICD-10-CM

## 2024-03-15 DIAGNOSIS — H16.143: ICD-10-CM

## 2024-03-15 DIAGNOSIS — H35.033: ICD-10-CM

## 2024-03-15 DIAGNOSIS — H01.005: ICD-10-CM

## 2024-03-15 DIAGNOSIS — H31.29: ICD-10-CM

## 2024-03-15 DIAGNOSIS — H43.393: ICD-10-CM

## 2024-03-15 PROBLEM — H16.223 DRY EYE SYNDROME K SICCA;  ,, BOTH EYES: Status: ACTIVE | Noted: 2024-03-15

## 2024-03-15 PROCEDURE — 99213 OFFICE O/P EST LOW 20 MIN: CPT | Performed by: OPHTHALMOLOGY

## 2024-03-15 ASSESSMENT — LID EXAM ASSESSMENTS
OD_PUNCTAL_STENOSIS: 1+
OS_BLEPHARITIS: 1+
OS_PUNCTAL_STENOSIS: 1+
OD_BLEPHARITIS: 1+

## 2024-03-20 PROBLEM — Z96.1 PSEUDOPHAKIA ; BOTH EYES: Status: ACTIVE | Noted: 2024-03-15

## 2024-03-20 PROBLEM — H31.29 PERIPAPILLARY ATROPHY ; BOTH EYES: Status: ACTIVE | Noted: 2024-03-15

## 2024-07-12 ENCOUNTER — OFFICE (OUTPATIENT)
Facility: LOCATION | Age: 79
Setting detail: OPHTHALMOLOGY
End: 2024-07-12
Payer: MEDICARE

## 2024-07-12 DIAGNOSIS — H04.563: ICD-10-CM

## 2024-07-12 DIAGNOSIS — H16.143: ICD-10-CM

## 2024-07-12 PROCEDURE — 99213 OFFICE O/P EST LOW 20 MIN: CPT | Performed by: OPHTHALMOLOGY

## 2024-07-12 ASSESSMENT — CONFRONTATIONAL VISUAL FIELD TEST (CVF)
OS_FINDINGS: FULL
OD_FINDINGS: FULL

## 2024-07-12 ASSESSMENT — LID EXAM ASSESSMENTS
OD_PUNCTAL_STENOSIS: 1+
OS_PUNCTAL_STENOSIS: 1+
OS_BLEPHARITIS: 1+
OD_BLEPHARITIS: 1+

## 2024-10-18 ENCOUNTER — RX ONLY (RX ONLY)
Age: 79
End: 2024-10-18

## 2024-10-18 ENCOUNTER — OFFICE (OUTPATIENT)
Facility: LOCATION | Age: 79
Setting detail: OPHTHALMOLOGY
End: 2024-10-18
Payer: MEDICARE

## 2024-10-18 DIAGNOSIS — H16.143: ICD-10-CM

## 2024-10-18 DIAGNOSIS — H04.563: ICD-10-CM

## 2024-10-18 PROCEDURE — 99213 OFFICE O/P EST LOW 20 MIN: CPT | Performed by: OPHTHALMOLOGY

## 2024-10-18 ASSESSMENT — TONOMETRY
OD_IOP_MMHG: 16
OS_IOP_MMHG: 16

## 2024-10-18 ASSESSMENT — LID EXAM ASSESSMENTS
OD_BLEPHARITIS: 1+
OD_PUNCTAL_STENOSIS: 1+
OS_BLEPHARITIS: 1+
OS_PUNCTAL_STENOSIS: 1+

## 2024-10-18 ASSESSMENT — SUPERFICIAL PUNCTATE KERATITIS (SPK)
OD_SPK: T 1+
OS_SPK: 1+ 2+

## 2024-10-18 ASSESSMENT — CONFRONTATIONAL VISUAL FIELD TEST (CVF)
OD_FINDINGS: FULL
OS_FINDINGS: FULL

## 2024-10-19 ASSESSMENT — REFRACTION_AUTOREFRACTION
OS_SPHERE: -0.75
OD_CYLINDER: SPHERE
OD_SPHERE: -0.75
OS_CYLINDER: +0.25
OS_AXIS: 047

## 2024-10-19 ASSESSMENT — KERATOMETRY
OS_K2POWER_DIOPTERS: 41.50
OD_K1POWER_DIOPTERS: 41.00
OS_AXISANGLE_DEGREES: 035
OD_AXISANGLE_DEGREES: 086
OS_K1POWER_DIOPTERS: 41.25
OD_K2POWER_DIOPTERS: 42.50

## 2024-10-19 ASSESSMENT — VISUAL ACUITY
OD_BCVA: 20/25-
OS_BCVA: 20/25-1

## 2025-01-17 ENCOUNTER — OFFICE (OUTPATIENT)
Facility: LOCATION | Age: 80
Setting detail: OPHTHALMOLOGY
End: 2025-01-17
Payer: MEDICARE

## 2025-01-17 DIAGNOSIS — H35.033: ICD-10-CM

## 2025-01-17 DIAGNOSIS — H16.223: ICD-10-CM

## 2025-01-17 PROCEDURE — 92250 FUNDUS PHOTOGRAPHY W/I&R: CPT | Performed by: OPHTHALMOLOGY

## 2025-01-17 PROCEDURE — 99213 OFFICE O/P EST LOW 20 MIN: CPT | Performed by: OPHTHALMOLOGY

## 2025-01-17 ASSESSMENT — SUPERFICIAL PUNCTATE KERATITIS (SPK)
OS_SPK: 1+ 2+
OD_SPK: T 1+

## 2025-01-17 ASSESSMENT — LID EXAM ASSESSMENTS
OS_BLEPHARITIS: 1+
OD_PUNCTAL_STENOSIS: 1+
OS_PUNCTAL_STENOSIS: 1+
OD_BLEPHARITIS: 1+

## 2025-01-17 ASSESSMENT — CONFRONTATIONAL VISUAL FIELD TEST (CVF)
OS_FINDINGS: FULL
OD_FINDINGS: FULL

## 2025-01-17 ASSESSMENT — TONOMETRY
OD_IOP_MMHG: 16
OS_IOP_MMHG: 16

## 2025-01-24 ASSESSMENT — KERATOMETRY
OD_AXISANGLE_DEGREES: 086
OS_K2POWER_DIOPTERS: 41.50
OS_AXISANGLE_DEGREES: 035
OD_K2POWER_DIOPTERS: 42.50
OS_K1POWER_DIOPTERS: 41.25
OD_K1POWER_DIOPTERS: 41.00

## 2025-01-24 ASSESSMENT — REFRACTION_AUTOREFRACTION
OD_CYLINDER: SPHERE
OS_SPHERE: -0.75
OS_CYLINDER: +0.25
OD_SPHERE: -0.75
OS_AXIS: 047

## 2025-01-24 ASSESSMENT — VISUAL ACUITY
OS_BCVA: 20/30
OD_BCVA: 20/30

## 2025-02-21 ENCOUNTER — RX ONLY (RX ONLY)
Age: 80
End: 2025-02-21

## 2025-02-21 ENCOUNTER — OFFICE (OUTPATIENT)
Facility: LOCATION | Age: 80
Setting detail: OPHTHALMOLOGY
End: 2025-02-21
Payer: MEDICARE

## 2025-02-21 DIAGNOSIS — T15.02XA: ICD-10-CM

## 2025-02-21 DIAGNOSIS — H04.563: ICD-10-CM

## 2025-02-21 PROCEDURE — 99213 OFFICE O/P EST LOW 20 MIN: CPT | Mod: 25 | Performed by: OPHTHALMOLOGY

## 2025-02-21 PROCEDURE — 65222 REMOVE FOREIGN BODY FROM EYE: CPT | Mod: LT | Performed by: OPHTHALMOLOGY

## 2025-02-21 ASSESSMENT — CONFRONTATIONAL VISUAL FIELD TEST (CVF)
OD_FINDINGS: FULL
OS_FINDINGS: FULL

## 2025-02-21 ASSESSMENT — SUPERFICIAL PUNCTATE KERATITIS (SPK)
OS_SPK: 1+ 2+
OD_SPK: T 1+

## 2025-02-21 ASSESSMENT — TONOMETRY
OD_IOP_MMHG: 14
OS_IOP_MMHG: 14

## 2025-02-21 ASSESSMENT — LID EXAM ASSESSMENTS
OS_BLEPHARITIS: 1+
OD_PUNCTAL_STENOSIS: 1+
OD_BLEPHARITIS: 1+
OS_PUNCTAL_STENOSIS: 1+

## 2025-02-21 ASSESSMENT — CORNEAL TRAUMA - FOREIGN BODY: OS_FOREIGNBODY: PRESENT

## 2025-02-24 PROBLEM — T15.02XA CORNEAL FOREIGN BODY; LEFT EYE: Status: ACTIVE | Noted: 2025-02-21

## 2025-02-24 ASSESSMENT — VISUAL ACUITY
OS_BCVA: 20/25
OD_BCVA: 20/25-1

## 2025-02-24 ASSESSMENT — KERATOMETRY
OD_K1POWER_DIOPTERS: 41.00
OS_K1POWER_DIOPTERS: 41.25
OS_K2POWER_DIOPTERS: 41.50
OD_AXISANGLE_DEGREES: 086
OD_K2POWER_DIOPTERS: 42.50
OS_AXISANGLE_DEGREES: 035

## 2025-02-24 ASSESSMENT — REFRACTION_AUTOREFRACTION
OS_CYLINDER: +0.25
OS_AXIS: 047
OD_SPHERE: -0.75
OD_CYLINDER: SPHERE
OS_SPHERE: -0.75

## 2025-05-23 ENCOUNTER — OFFICE (OUTPATIENT)
Facility: LOCATION | Age: 80
Setting detail: OPHTHALMOLOGY
End: 2025-05-23
Payer: MEDICARE

## 2025-05-23 DIAGNOSIS — H04.563: ICD-10-CM

## 2025-05-23 DIAGNOSIS — H35.033: ICD-10-CM

## 2025-05-23 DIAGNOSIS — H43.393: ICD-10-CM

## 2025-05-23 DIAGNOSIS — H16.223: ICD-10-CM

## 2025-05-23 PROCEDURE — 92014 COMPRE OPH EXAM EST PT 1/>: CPT | Performed by: OPHTHALMOLOGY

## 2025-05-23 ASSESSMENT — LID EXAM ASSESSMENTS
OD_PUNCTAL_STENOSIS: 1+
OS_PUNCTAL_STENOSIS: 1+
OD_BLEPHARITIS: 1+
OS_BLEPHARITIS: 1+

## 2025-05-23 ASSESSMENT — TONOMETRY
OD_IOP_MMHG: 18
OS_IOP_MMHG: 18

## 2025-05-23 ASSESSMENT — SUPERFICIAL PUNCTATE KERATITIS (SPK)
OD_SPK: T 1+
OS_SPK: 1+ 2+

## 2025-05-23 ASSESSMENT — CONFRONTATIONAL VISUAL FIELD TEST (CVF)
OS_FINDINGS: FULL
OD_FINDINGS: FULL

## 2025-05-23 ASSESSMENT — CORNEAL TRAUMA - FOREIGN BODY: OS_FOREIGNBODY: PRESENT

## 2025-05-27 ASSESSMENT — KERATOMETRY
OD_K1POWER_DIOPTERS: 41.00
OS_K1POWER_DIOPTERS: 41.25
OS_AXISANGLE_DEGREES: 035
OD_AXISANGLE_DEGREES: 086
OD_K2POWER_DIOPTERS: 42.50
OS_K2POWER_DIOPTERS: 41.50

## 2025-05-27 ASSESSMENT — REFRACTION_AUTOREFRACTION
OD_CYLINDER: +1.00
OS_CYLINDER: +1.00
OD_SPHERE: -0.75
OS_SPHERE: -0.50
OD_AXIS: 156
OS_AXIS: 001

## 2025-05-27 ASSESSMENT — VISUAL ACUITY
OS_BCVA: 20/25
OD_BCVA: 20/25-2

## 2025-08-22 ENCOUNTER — OFFICE (OUTPATIENT)
Facility: LOCATION | Age: 80
Setting detail: OPHTHALMOLOGY
End: 2025-08-22
Payer: MEDICARE

## 2025-08-22 ENCOUNTER — RX ONLY (RX ONLY)
Age: 80
End: 2025-08-22

## 2025-08-22 DIAGNOSIS — H16.223: ICD-10-CM

## 2025-08-22 DIAGNOSIS — H04.563: ICD-10-CM

## 2025-08-22 PROCEDURE — 99213 OFFICE O/P EST LOW 20 MIN: CPT | Performed by: OPHTHALMOLOGY

## 2025-08-22 ASSESSMENT — SUPERFICIAL PUNCTATE KERATITIS (SPK)
OD_SPK: T 1+
OS_SPK: 1+ 2+

## 2025-08-22 ASSESSMENT — LID EXAM ASSESSMENTS
OS_BLEPHARITIS: 1+
OD_BLEPHARITIS: 1+
OS_PUNCTAL_STENOSIS: 1+
OD_PUNCTAL_STENOSIS: 1+

## 2025-08-22 ASSESSMENT — CONFRONTATIONAL VISUAL FIELD TEST (CVF)
OD_FINDINGS: FULL
OS_FINDINGS: FULL

## 2025-08-22 ASSESSMENT — TONOMETRY
OD_IOP_MMHG: 18
OS_IOP_MMHG: 18

## 2025-08-22 ASSESSMENT — CORNEAL TRAUMA - FOREIGN BODY: OS_FOREIGNBODY: PRESENT

## 2025-08-25 ASSESSMENT — KERATOMETRY
OD_K1POWER_DIOPTERS: 41.00
OS_K2POWER_DIOPTERS: 41.50
OS_AXISANGLE_DEGREES: 035
OS_K1POWER_DIOPTERS: 41.25
OD_AXISANGLE_DEGREES: 086
OD_K2POWER_DIOPTERS: 42.50

## 2025-08-25 ASSESSMENT — VISUAL ACUITY
OS_BCVA: 20/30-2
OD_BCVA: 20/30-2

## 2025-08-25 ASSESSMENT — REFRACTION_AUTOREFRACTION
OS_AXIS: 001
OS_SPHERE: -0.50
OD_AXIS: 156
OD_SPHERE: -0.75
OS_CYLINDER: +1.00
OD_CYLINDER: +1.00